# Patient Record
Sex: MALE | Race: WHITE | Employment: OTHER | URBAN - METROPOLITAN AREA
[De-identification: names, ages, dates, MRNs, and addresses within clinical notes are randomized per-mention and may not be internally consistent; named-entity substitution may affect disease eponyms.]

---

## 2018-09-15 ENCOUNTER — APPOINTMENT (OUTPATIENT)
Dept: VASCULAR SURGERY | Age: 68
End: 2018-09-15
Attending: EMERGENCY MEDICINE
Payer: MEDICARE

## 2018-09-15 ENCOUNTER — HOSPITAL ENCOUNTER (EMERGENCY)
Age: 68
Discharge: HOME | End: 2018-09-15
Attending: EMERGENCY MEDICINE
Payer: MEDICARE

## 2018-09-15 VITALS
DIASTOLIC BLOOD PRESSURE: 70 MMHG | TEMPERATURE: 98.3 F | HEIGHT: 72 IN | HEART RATE: 76 BPM | OXYGEN SATURATION: 94 % | RESPIRATION RATE: 16 BRPM | SYSTOLIC BLOOD PRESSURE: 146 MMHG | BODY MASS INDEX: 41.85 KG/M2 | WEIGHT: 309 LBS

## 2018-09-15 DIAGNOSIS — I83.029 VENOUS STASIS ULCERS OF BOTH LOWER EXTREMITIES (HCC): Primary | ICD-10-CM

## 2018-09-15 DIAGNOSIS — L97.919 VENOUS STASIS ULCERS OF BOTH LOWER EXTREMITIES (HCC): Primary | ICD-10-CM

## 2018-09-15 DIAGNOSIS — I83.019 VENOUS STASIS ULCERS OF BOTH LOWER EXTREMITIES (HCC): Primary | ICD-10-CM

## 2018-09-15 DIAGNOSIS — L97.929 VENOUS STASIS ULCERS OF BOTH LOWER EXTREMITIES (HCC): Primary | ICD-10-CM

## 2018-09-15 LAB
ALBUMIN SERPL-MCNC: 2.8 G/DL (ref 3.4–5)
ALBUMIN/GLOB SERPL: 0.5 {RATIO} (ref 0.8–1.7)
ALP SERPL-CCNC: 107 U/L (ref 45–117)
ALT SERPL-CCNC: 55 U/L (ref 16–61)
ANION GAP SERPL CALC-SCNC: 7 MMOL/L (ref 3–18)
AST SERPL-CCNC: 101 U/L (ref 15–37)
BASOPHILS # BLD: 0.1 K/UL (ref 0–0.1)
BASOPHILS NFR BLD: 2 % (ref 0–2)
BILIRUB SERPL-MCNC: 1.7 MG/DL (ref 0.2–1)
BUN SERPL-MCNC: 13 MG/DL (ref 7–18)
BUN/CREAT SERPL: 15 (ref 12–20)
CALCIUM SERPL-MCNC: 8.9 MG/DL (ref 8.5–10.1)
CHLORIDE SERPL-SCNC: 102 MMOL/L (ref 100–108)
CO2 SERPL-SCNC: 30 MMOL/L (ref 21–32)
CREAT SERPL-MCNC: 0.89 MG/DL (ref 0.6–1.3)
DIFFERENTIAL METHOD BLD: ABNORMAL
EOSINOPHIL # BLD: 0.4 K/UL (ref 0–0.4)
EOSINOPHIL NFR BLD: 7 % (ref 0–5)
ERYTHROCYTE [DISTWIDTH] IN BLOOD BY AUTOMATED COUNT: 14.7 % (ref 11.6–14.5)
GLOBULIN SER CALC-MCNC: 5.5 G/DL (ref 2–4)
GLUCOSE SERPL-MCNC: 112 MG/DL (ref 74–99)
HCT VFR BLD AUTO: 39.6 % (ref 36–48)
HGB BLD-MCNC: 13.9 G/DL (ref 13–16)
INR PPP: 1.8 (ref 0.8–1.2)
LYMPHOCYTES # BLD: 1.6 K/UL (ref 0.9–3.6)
LYMPHOCYTES NFR BLD: 29 % (ref 21–52)
MCH RBC QN AUTO: 31.2 PG (ref 24–34)
MCHC RBC AUTO-ENTMCNC: 35.1 G/DL (ref 31–37)
MCV RBC AUTO: 88.8 FL (ref 74–97)
MONOCYTES # BLD: 1.1 K/UL (ref 0.05–1.2)
MONOCYTES NFR BLD: 20 % (ref 3–10)
NEUTS SEG # BLD: 2.4 K/UL (ref 1.8–8)
NEUTS SEG NFR BLD: 42 % (ref 40–73)
PLATELET # BLD AUTO: 153 K/UL (ref 135–420)
PMV BLD AUTO: 11.2 FL (ref 9.2–11.8)
POTASSIUM SERPL-SCNC: 3.3 MMOL/L (ref 3.5–5.5)
PROT SERPL-MCNC: 8.3 G/DL (ref 6.4–8.2)
PROTHROMBIN TIME: 20.8 SEC (ref 11.5–15.2)
RBC # BLD AUTO: 4.46 M/UL (ref 4.7–5.5)
SODIUM SERPL-SCNC: 139 MMOL/L (ref 136–145)
WBC # BLD AUTO: 5.5 K/UL (ref 4.6–13.2)

## 2018-09-15 PROCEDURE — 74011250637 HC RX REV CODE- 250/637: Performed by: EMERGENCY MEDICINE

## 2018-09-15 PROCEDURE — 99284 EMERGENCY DEPT VISIT MOD MDM: CPT

## 2018-09-15 PROCEDURE — 93970 EXTREMITY STUDY: CPT

## 2018-09-15 PROCEDURE — 85610 PROTHROMBIN TIME: CPT | Performed by: EMERGENCY MEDICINE

## 2018-09-15 PROCEDURE — 85025 COMPLETE CBC W/AUTO DIFF WBC: CPT | Performed by: EMERGENCY MEDICINE

## 2018-09-15 PROCEDURE — 80053 COMPREHEN METABOLIC PANEL: CPT | Performed by: EMERGENCY MEDICINE

## 2018-09-15 RX ORDER — POTASSIUM CHLORIDE 20MEQ/15ML
40 LIQUID (ML) ORAL
Status: DISCONTINUED | OUTPATIENT
Start: 2018-09-15 | End: 2018-09-15 | Stop reason: CLARIF

## 2018-09-15 RX ORDER — POTASSIUM CHLORIDE 1.5 G/1.77G
40 POWDER, FOR SOLUTION ORAL
Status: DISCONTINUED | OUTPATIENT
Start: 2018-09-15 | End: 2018-09-15 | Stop reason: HOSPADM

## 2018-09-15 RX ORDER — POTASSIUM CHLORIDE 20 MEQ/1
40 TABLET, EXTENDED RELEASE ORAL
Status: COMPLETED | OUTPATIENT
Start: 2018-09-15 | End: 2018-09-15

## 2018-09-15 RX ADMIN — POTASSIUM CHLORIDE 40 MEQ: 20 TABLET, EXTENDED RELEASE ORAL at 12:17

## 2018-09-15 NOTE — DISCHARGE INSTRUCTIONS
Venous Skin Ulcer: Care Instructions  Your Care Instructions  A venous skin ulcer is a shallow wound that develops when the leg veins do not move blood back to the heart normally. Your veins have one-way valves that keep blood flowing toward the heart. When the valves are damaged, the blood can back up and pool in the vein. The blood may leak out of the vein into tissue around the vein. The tissue can break down and form an ulcer. The first sign of a venous skin ulcer is skin that turns dark red or purple over the area where the blood is leaking out of the vein. The skin also may become thick, dry, and itchy. Without treatment, an ulcer may form. The ulcer may be painful. Your leg also may swell and ache. If the ulcer becomes infected, the infection may cause an odor, and pus may drain from the ulcer. The area around the ulcer also may be more tender and red. Follow-up care is a key part of your treatment and safety. Be sure to make and go to all appointments, and call your doctor if you are having problems. It's also a good idea to know your test results and keep a list of the medicines you take. How can you care for yourself at home? · Follow your doctor's instructions on how to clean the ulcer and change the bandage. · If your doctor prescribed antibiotics, take them as directed. Do not stop taking them just because you feel better. You need to take the full course of antibiotics. · Lift your legs above the level of your heart as often as possible. For example, lie down and then prop up your legs with pillows. · Wear compression stockings or bandages. They help the blood circulate in your legs. And they help prevent blood from pooling in your legs. But there are different types of stockings, and they need to fit right. So your doctor will recommend what you need. · After your ulcer has healed, continue to wear compression stockings. Take them off only when you bathe and sleep.  Compression helps your blood circulate and helps prevent other ulcers from forming. · Walk daily. Walking helps your blood circulation. When should you call for help? Call your doctor now or seek immediate medical care if:    · You have symptoms of infection, such as:  ¨ Increased pain, swelling, warmth, or redness. ¨ Red streaks leading from the ulcer. ¨ Pus draining from the ulcer. ¨ A fever.    Watch closely for changes in your health, and be sure to contact your doctor if:    · Your ulcer is not healing.     · You have new ulcers.     · The ulcer starts to bleed, and blood soaks through the bandage. Oozing small amounts of a mix of blood and fluid is normal.     · You have new bleeding.     · You do not get better as expected. Where can you learn more? Go to http://hong-natalia.info/. Enter A209 in the search box to learn more about \"Venous Skin Ulcer: Care Instructions. \"  Current as of: November 20, 2017  Content Version: 11.7  © 6111-5983 [employer]. Care instructions adapted under license by [employer] (which disclaims liability or warranty for this information). If you have questions about a medical condition or this instruction, always ask your healthcare professional. [place] [address] any warranty or liability for your use of this information. Learning About Using Compression Stockings  What are compression stockings? Compression stockings may help ease symptoms and prevent problems caused by things like varicose veins, skin ulcers, and deep vein thrombosis. But there are different types of stockings, and they need to fit right. So your doctor will recommend what you need. These stockings are the most common treatment for varicose veins. They help the blood circulate in your legs. This can prevent skin ulcers and keep blood from building up in the legs. Prescription stockings are tightest at the foot.  They get less and less tight farther up on your legs.  The kind you buy without a prescription have lighter elastic. So the pressure is even all the way up the leg. These don't cost as much. But they don't provide the compression you need to treat serious symptoms or prevent skin ulcers. How do you use compression stockings? · If your stockings are new, you may want to wash them before you put them on. This can make them more flexible and easier to put on. It's a good idea to wash them by hand. · Most of the time it's best to put on your stockings early in the morning. This is when you have the least swelling in your legs. · Put silicone lotion (such as ALPS) or talcum powder on your legs to help the stockings slide on. If your stockings contain latex, or you aren't sure if they contain latex, do not use other types of lotions or creams on your legs when you wear the stockings. You may use other lotions or creams when you are not wearing the stockings. · Sit in a chair with a back while you put on the stockings. This gives you something to lean against as you pull them up. · How to put on stockings:  ¨ Turn your stocking inside out. Then put your toe in as far as it will go. ¨ Readjust the stocking by folding it back onto itself at the ankle. Then hold both sides of the folded stocking. ¨ Pull toward your body as far as you can. ¨ Fold back the stocking again farther up on your leg. Then pull the stocking up to that point. ¨ Repeat folding back and pulling until the stocking is in the right place. · You may want to wear rubber gloves. They can help you hold the stockings better. · If your stockings don't have toes, use a silk \"slip sock. \" (You can get one from your medical supplier.) This will help the stocking slide over your foot better. When you're done, pull off the sock through the open toe. · If you still can't get your stockings on, you may want to use a \"stocking oneal. \" This is a metal device that holds the stocking open while you step into it. It is often recommended for people who have problems grasping, leaning, or pulling. Before you buy one, try it first. Some people find them hard to use. What else should you know about compression stockings? · You will probably need to buy a new pair every 4 to 6 months. · They can be uncomfortable if you wear them all day. They are hot and may be hard to put on. · It is important to think about the pros and cons of stockings. You may not like them. But they may help relieve symptoms and prevent future problems. Where can you learn more? Go to http://hong-natalia.info/. Enter J166 in the search box to learn more about \"Learning About Using Compression Stockings. \"  Current as of: November 21, 2017  Content Version: 11.7  © 7085-3932 [employer]. Care instructions adapted under license by [employer] (which disclaims liability or warranty for this information). If you have questions about a medical condition or this instruction, always ask your healthcare professional. [place] [address] any warranty or liability for your use of this information.

## 2018-09-15 NOTE — ED PROVIDER NOTES
EMERGENCY DEPARTMENT HISTORY AND PHYSICAL EXAM    9:49 AM      Date: 9/15/2018  Patient Name: Willi Dixon    History of Presenting Illness     Chief Complaint   Patient presents with    Skin Infection         History Provided By: Patient    Chief Complaint: Skin problem   Duration:3  Months  Timing:  Constant and Worsening  Location: bilateral lower legs   Quality: erythematous and burning   Severity: Moderate  Modifying Factors: Bactrim and Keflex, with no relief of Sx   Associated Symptoms: None       Additional History (Context): Willi Dixon is a 70 y.o. male with PMHx of HTN, multiple PE, fatty liver, DVT, and 4 past brain surgeries who presents with c/o moderate constant and worsening erythematous and burning skin problem located on the bilateral lower legs that started 3 months ago. Pt states the Sx started 3 months ago and he was seen and evaluated by his PCP and he was prescribed Bactrim and he finished the 10 day course of the antibiotic and the Sx worsened. The pt then went to [facility] to be evaluated 2 months ago and he was prescribed Keflex, there was no relief of Sx. Pt states he wants to be evaluated now because the skin problem is beginning to be uncomfortable and starting to burn. Pt is a past tobacco user. Pt denies nausea, vomiting, fever, and chills. Denies any further complaints or symptoms at the moment. PCP: Lilian Lee MD        Past History     Past Medical History:  No past medical history on file. Past Surgical History:  No past surgical history on file. Family History:  No family history on file. Social History:  Social History   Substance Use Topics    Smoking status: Not on file    Smokeless tobacco: Not on file    Alcohol use Not on file       Allergies:   Allergies   Allergen Reactions    Penicillins Unknown (comments)     Patient was told he was allergic since he was an infant         Review of Systems       Review of Systems   Constitutional: Negative for chills and fever. Respiratory: Negative for shortness of breath. Cardiovascular: Negative for chest pain. Gastrointestinal: Negative for nausea and vomiting. Skin: Positive for rash (bilateral lower legs). All other systems reviewed and are negative. Physical Exam     Visit Vitals    /70    Pulse 76    Temp 98.3 °F (36.8 °C)    Resp 16    Ht 6' (1.829 m)    Wt 140.2 kg (309 lb)    SpO2 94%    BMI 41.91 kg/m2         Physical Exam   Constitutional: He is oriented to person, place, and time. He appears well-developed and well-nourished. No distress. HENT:   Head: Normocephalic and atraumatic. Eyes: Conjunctivae and EOM are normal. Right eye exhibits no discharge. Left eye exhibits no discharge. No scleral icterus. Neck: Normal range of motion. Neck supple. No tracheal deviation present. Cardiovascular: Normal rate, regular rhythm and normal heart sounds. No murmur heard. Pulmonary/Chest: Effort normal and breath sounds normal. No respiratory distress. He has no wheezes. He has no rales. Abdominal: Soft. He exhibits no distension. There is no tenderness. There is no rebound and no guarding. Musculoskeletal: Normal range of motion. He exhibits no edema or deformity. Neurological: He is alert and oriented to person, place, and time. No cranial nerve deficit. Skin: Skin is warm and dry. He is not diaphoretic. Bilateral lower leg erythema to knee with multiple ulcerations and shiny skin    Psychiatric: He has a normal mood and affect.  His behavior is normal. Judgment and thought content normal.         Diagnostic Study Results     Labs -  Recent Results (from the past 12 hour(s))   CBC WITH AUTOMATED DIFF    Collection Time: 09/15/18  9:38 AM   Result Value Ref Range    WBC 5.5 4.6 - 13.2 K/uL    RBC 4.46 (L) 4.70 - 5.50 M/uL    HGB 13.9 13.0 - 16.0 g/dL    HCT 39.6 36.0 - 48.0 %    MCV 88.8 74.0 - 97.0 FL    MCH 31.2 24.0 - 34.0 PG    MCHC 35.1 31.0 - 37.0 g/dL    RDW 14. 7 (H) 11.6 - 14.5 %    PLATELET 785 013 - 761 K/uL    MPV 11.2 9.2 - 11.8 FL    NEUTROPHILS 42 40 - 73 %    LYMPHOCYTES 29 21 - 52 %    MONOCYTES 20 (H) 3 - 10 %    EOSINOPHILS 7 (H) 0 - 5 %    BASOPHILS 2 0 - 2 %    ABS. NEUTROPHILS 2.4 1.8 - 8.0 K/UL    ABS. LYMPHOCYTES 1.6 0.9 - 3.6 K/UL    ABS. MONOCYTES 1.1 0.05 - 1.2 K/UL    ABS. EOSINOPHILS 0.4 0.0 - 0.4 K/UL    ABS. BASOPHILS 0.1 0.0 - 0.1 K/UL    DF AUTOMATED     METABOLIC PANEL, COMPREHENSIVE    Collection Time: 09/15/18  9:38 AM   Result Value Ref Range    Sodium 139 136 - 145 mmol/L    Potassium 3.3 (L) 3.5 - 5.5 mmol/L    Chloride 102 100 - 108 mmol/L    CO2 30 21 - 32 mmol/L    Anion gap 7 3.0 - 18 mmol/L    Glucose 112 (H) 74 - 99 mg/dL    BUN 13 7.0 - 18 MG/DL    Creatinine 0.89 0.6 - 1.3 MG/DL    BUN/Creatinine ratio 15 12 - 20      GFR est AA >60 >60 ml/min/1.73m2    GFR est non-AA >60 >60 ml/min/1.73m2    Calcium 8.9 8.5 - 10.1 MG/DL    Bilirubin, total 1.7 (H) 0.2 - 1.0 MG/DL    ALT (SGPT) 55 16 - 61 U/L    AST (SGOT) 101 (H) 15 - 37 U/L    Alk. phosphatase 107 45 - 117 U/L    Protein, total 8.3 (H) 6.4 - 8.2 g/dL    Albumin 2.8 (L) 3.4 - 5.0 g/dL    Globulin 5.5 (H) 2.0 - 4.0 g/dL    A-G Ratio 0.5 (L) 0.8 - 1.7     PROTHROMBIN TIME + INR    Collection Time: 09/15/18  9:38 AM   Result Value Ref Range    Prothrombin time 20.8 (H) 11.5 - 15.2 sec    INR 1.8 (H) 0.8 - 1.2         Radiologic Studies -   No orders to display         Medical Decision Making   I am the first provider for this patient. I reviewed the vital signs, available nursing notes, past medical history, past surgical history, family history and social history. Vital Signs-Reviewed the patient's vital signs. Pulse Oximetry Analysis -  93% on room air     Records Reviewed: Nursing Notes (Time of Review: 9:49 AM)    Provider Notes (Medical Decision Making): Pt with chronic venostasis dermatitis and ulcerations. No acute DVT on imaging.  Pt had elevated fasting glucose, low potassium, and elevated liver function test. I discussed the need for PCP follow-up to be checked for diabetes and discussed heavy ETOH use and the need to stop drinking. Pt will be referred to vascular for chronic venostasis. Diagnosis     Clinical Impression:   1. Venous stasis ulcers of both lower extremities ([address]        Disposition: discharged     Follow-up Information     Follow up With Details Comments Contact Info Additional Information    [facility] EMERGENCY DEPT  If symptoms worsen [address] [facility] [address]     [facility] VASCULAR LAB Schedule an appointment as soon as possible for a visit  [facility]  [address] [facility] [address] 3:00PM:  Please arrive in the [address] [place] [address] and check in with the registrar 15 minutes prior to your scheduled appointment time. This is the same entrance as the [facility], facing [address]. [facility] Parking: turn off [address] onto [address]. Proceed one block and make a right onto [address] [place] will be on your left). Go to the end of [address] and parking is located on your left. APPOINTMENTS SCHEDULED AT 3:00PM OR LATER:  Registration in the [address] [place] [address] CLOSES at 3:00 p.m. Patients should report to Patient Access/Admitting located on the left after entering the MAIN entrance of [facility]. Patient should plan to arrive 30 minutes prior to their appointment time if going to Patient Access/Admitting. After test, patient may exit from the [address] [place] [address] or [facility] Entrance. Discharge Medication List as of 9/15/2018 12:06 PM      START taking these medications    Details   compr.stocking,knee,long,large (COMP STOCKING,KNEE,LONG,LARGE) misc 1 Package by Does Not Apply route daily. , Print, Disp-3 Package, R-0           _______________________________    Attestations:  Ximena Cid 89 acting as a scribe for and in the presence of Sebas Hernandez MD      September 15, 2018 at 9:49 AM       Provider Attestation:      I personally performed the services described in the documentation, reviewed the documentation, as recorded by the scribe in my presence, and it accurately and completely records my words and actions.  September 15, 2018 at 9:49 AM - Sebas Hernandez MD    _______________________________

## 2018-09-15 NOTE — ED TRIAGE NOTES
Patient  Presents with bilateral cellulitis in legs x 3 months. Patient has had several rounds of antibiotics but with no relief.  Patient has a hx of HTN and PE.

## 2022-06-14 ENCOUNTER — APPOINTMENT (OUTPATIENT)
Dept: GENERAL RADIOLOGY | Age: 72
DRG: 291 | End: 2022-06-14
Attending: STUDENT IN AN ORGANIZED HEALTH CARE EDUCATION/TRAINING PROGRAM
Payer: MEDICARE

## 2022-06-14 ENCOUNTER — APPOINTMENT (OUTPATIENT)
Dept: ULTRASOUND IMAGING | Age: 72
DRG: 291 | End: 2022-06-14
Attending: STUDENT IN AN ORGANIZED HEALTH CARE EDUCATION/TRAINING PROGRAM
Payer: MEDICARE

## 2022-06-14 ENCOUNTER — HOSPITAL ENCOUNTER (INPATIENT)
Age: 72
LOS: 8 days | Discharge: HOME | DRG: 291 | End: 2022-06-22
Attending: STUDENT IN AN ORGANIZED HEALTH CARE EDUCATION/TRAINING PROGRAM | Admitting: HOSPITALIST
Payer: MEDICARE

## 2022-06-14 DIAGNOSIS — I50.33 ACUTE ON CHRONIC DIASTOLIC HEART FAILURE (HCC): ICD-10-CM

## 2022-06-14 PROBLEM — G47.30 SLEEP APNEA: Status: ACTIVE | Noted: 2019-02-21

## 2022-06-14 PROBLEM — M79.605 BILATERAL LOWER EXTREMITY PAIN: Status: ACTIVE | Noted: 2022-06-14

## 2022-06-14 PROBLEM — I48.0 PAROXYSMAL ATRIAL FIBRILLATION (HCC): Status: ACTIVE | Noted: 2020-08-25

## 2022-06-14 PROBLEM — I50.32 CHRONIC DIASTOLIC HEART FAILURE (HCC): Status: ACTIVE | Noted: 2019-02-21

## 2022-06-14 PROBLEM — I10 ESSENTIAL HYPERTENSION: Status: ACTIVE | Noted: 2018-01-31

## 2022-06-14 PROBLEM — E66.01 CLASS 2 SEVERE OBESITY DUE TO EXCESS CALORIES WITH SERIOUS COMORBIDITY IN ADULT (HCC): Status: ACTIVE | Noted: 2019-05-03

## 2022-06-14 PROBLEM — E66.9 OBESITY (BMI 30-39.9): Status: ACTIVE | Noted: 2019-02-21

## 2022-06-14 PROBLEM — F32.A CHRONIC DEPRESSION: Status: ACTIVE | Noted: 2021-08-25

## 2022-06-14 PROBLEM — M79.604 BILATERAL LOWER EXTREMITY PAIN: Status: ACTIVE | Noted: 2022-06-14

## 2022-06-14 PROBLEM — F10.11 H/O ALCOHOL ABUSE: Status: ACTIVE | Noted: 2019-02-21

## 2022-06-14 PROBLEM — I73.9 PERIPHERAL VASCULAR DISEASE, UNSPECIFIED (HCC): Status: ACTIVE | Noted: 2022-02-23

## 2022-06-14 PROBLEM — E87.70 FLUID OVERLOAD: Status: ACTIVE | Noted: 2022-06-14

## 2022-06-14 PROBLEM — N50.89 SCROTAL EDEMA: Status: ACTIVE | Noted: 2022-06-14

## 2022-06-14 PROBLEM — F33.41 RECURRENT MAJOR DEPRESSIVE DISORDER, IN PARTIAL REMISSION (HCC): Status: ACTIVE | Noted: 2019-02-26

## 2022-06-14 PROBLEM — G20 PARKINSON'S DISEASE (HCC): Status: ACTIVE | Noted: 2022-02-23

## 2022-06-14 LAB
ALBUMIN SERPL-MCNC: 2.9 G/DL (ref 3.4–5)
ALBUMIN/GLOB SERPL: 0.7 {RATIO} (ref 0.8–1.7)
ALP SERPL-CCNC: 122 U/L (ref 45–117)
ALT SERPL-CCNC: 18 U/L (ref 16–61)
ANION GAP SERPL CALC-SCNC: 5 MMOL/L (ref 3–18)
APPEARANCE UR: CLEAR
AST SERPL-CCNC: 30 U/L (ref 10–38)
BASOPHILS # BLD: 0.1 K/UL (ref 0–0.1)
BASOPHILS NFR BLD: 1 % (ref 0–2)
BILIRUB DIRECT SERPL-MCNC: 0.4 MG/DL (ref 0–0.2)
BILIRUB SERPL-MCNC: 1.5 MG/DL (ref 0.2–1)
BILIRUB UR QL: NEGATIVE
BNP SERPL-MCNC: 103 PG/ML (ref 0–900)
BUN SERPL-MCNC: 9 MG/DL (ref 7–18)
BUN/CREAT SERPL: 12 (ref 12–20)
CALCIUM SERPL-MCNC: 8.5 MG/DL (ref 8.5–10.1)
CHLORIDE SERPL-SCNC: 103 MMOL/L (ref 100–111)
CO2 SERPL-SCNC: 32 MMOL/L (ref 21–32)
COLOR UR: YELLOW
CREAT SERPL-MCNC: 0.73 MG/DL (ref 0.6–1.3)
DIFFERENTIAL METHOD BLD: ABNORMAL
EOSINOPHIL # BLD: 0.2 K/UL (ref 0–0.4)
EOSINOPHIL NFR BLD: 4 % (ref 0–5)
ERYTHROCYTE [DISTWIDTH] IN BLOOD BY AUTOMATED COUNT: 12.4 % (ref 11.6–14.5)
GLOBULIN SER CALC-MCNC: 4.2 G/DL (ref 2–4)
GLUCOSE SERPL-MCNC: 85 MG/DL (ref 74–99)
GLUCOSE UR STRIP.AUTO-MCNC: NEGATIVE MG/DL
HCT VFR BLD AUTO: 40.7 % (ref 36–48)
HGB BLD-MCNC: 13.8 G/DL (ref 13–16)
HGB UR QL STRIP: NEGATIVE
IMM GRANULOCYTES # BLD AUTO: 0 K/UL (ref 0–0.04)
IMM GRANULOCYTES NFR BLD AUTO: 0 % (ref 0–0.5)
INR PPP: 2.1 (ref 0.8–1.2)
KETONES UR QL STRIP.AUTO: NEGATIVE MG/DL
LEUKOCYTE ESTERASE UR QL STRIP.AUTO: NEGATIVE
LYMPHOCYTES # BLD: 1.4 K/UL (ref 0.9–3.6)
LYMPHOCYTES NFR BLD: 27 % (ref 21–52)
MCH RBC QN AUTO: 31.5 PG (ref 24–34)
MCHC RBC AUTO-ENTMCNC: 33.9 G/DL (ref 31–37)
MCV RBC AUTO: 92.9 FL (ref 78–100)
MONOCYTES # BLD: 1.1 K/UL (ref 0.05–1.2)
MONOCYTES NFR BLD: 21 % (ref 3–10)
NEUTS SEG # BLD: 2.5 K/UL (ref 1.8–8)
NEUTS SEG NFR BLD: 47 % (ref 40–73)
NITRITE UR QL STRIP.AUTO: NEGATIVE
NRBC # BLD: 0 K/UL (ref 0–0.01)
NRBC BLD-RTO: 0 PER 100 WBC
PH UR STRIP: 7.5 [PH] (ref 5–8)
PLATELET # BLD AUTO: 119 K/UL (ref 135–420)
PMV BLD AUTO: 11.6 FL (ref 9.2–11.8)
POTASSIUM SERPL-SCNC: 3.6 MMOL/L (ref 3.5–5.5)
PROT SERPL-MCNC: 7.1 G/DL (ref 6.4–8.2)
PROT UR STRIP-MCNC: NEGATIVE MG/DL
PROTHROMBIN TIME: 23.5 SEC (ref 11.5–15.2)
RBC # BLD AUTO: 4.38 M/UL (ref 4.35–5.65)
SODIUM SERPL-SCNC: 140 MMOL/L (ref 136–145)
SP GR UR REFRACTOMETRY: 1.01 (ref 1–1.03)
UROBILINOGEN UR QL STRIP.AUTO: 2 EU/DL (ref 0.2–1)
WBC # BLD AUTO: 5.3 K/UL (ref 4.6–13.2)

## 2022-06-14 PROCEDURE — 99223 1ST HOSP IP/OBS HIGH 75: CPT | Performed by: HOSPITALIST

## 2022-06-14 PROCEDURE — 85025 COMPLETE CBC W/AUTO DIFF WBC: CPT

## 2022-06-14 PROCEDURE — 76870 US EXAM SCROTUM: CPT

## 2022-06-14 PROCEDURE — 85610 PROTHROMBIN TIME: CPT

## 2022-06-14 PROCEDURE — 99285 EMERGENCY DEPT VISIT HI MDM: CPT

## 2022-06-14 PROCEDURE — 83880 ASSAY OF NATRIURETIC PEPTIDE: CPT

## 2022-06-14 PROCEDURE — 74011250636 HC RX REV CODE- 250/636: Performed by: NURSE PRACTITIONER

## 2022-06-14 PROCEDURE — 96374 THER/PROPH/DIAG INJ IV PUSH: CPT

## 2022-06-14 PROCEDURE — 51702 INSERT TEMP BLADDER CATH: CPT

## 2022-06-14 PROCEDURE — 80048 BASIC METABOLIC PNL TOTAL CA: CPT

## 2022-06-14 PROCEDURE — APPSS60 APP SPLIT SHARED TIME 46-60 MINUTES: Performed by: NURSE PRACTITIONER

## 2022-06-14 PROCEDURE — 74011250636 HC RX REV CODE- 250/636: Performed by: STUDENT IN AN ORGANIZED HEALTH CARE EDUCATION/TRAINING PROGRAM

## 2022-06-14 PROCEDURE — 71045 X-RAY EXAM CHEST 1 VIEW: CPT

## 2022-06-14 PROCEDURE — 74011000250 HC RX REV CODE- 250: Performed by: NURSE PRACTITIONER

## 2022-06-14 PROCEDURE — 65270000046 HC RM TELEMETRY

## 2022-06-14 PROCEDURE — 2709999900 HC NON-CHARGEABLE SUPPLY

## 2022-06-14 PROCEDURE — 81003 URINALYSIS AUTO W/O SCOPE: CPT

## 2022-06-14 PROCEDURE — 80076 HEPATIC FUNCTION PANEL: CPT

## 2022-06-14 PROCEDURE — 74011250637 HC RX REV CODE- 250/637: Performed by: NURSE PRACTITIONER

## 2022-06-14 PROCEDURE — 93005 ELECTROCARDIOGRAM TRACING: CPT

## 2022-06-14 PROCEDURE — 36415 COLL VENOUS BLD VENIPUNCTURE: CPT

## 2022-06-14 RX ORDER — PANTOPRAZOLE SODIUM 40 MG/1
40 TABLET, DELAYED RELEASE ORAL
Status: DISCONTINUED | OUTPATIENT
Start: 2022-06-15 | End: 2022-06-22 | Stop reason: HOSPADM

## 2022-06-14 RX ORDER — FLUOXETINE HYDROCHLORIDE 20 MG/1
20 CAPSULE ORAL DAILY
Status: DISCONTINUED | OUTPATIENT
Start: 2022-06-15 | End: 2022-06-22 | Stop reason: HOSPADM

## 2022-06-14 RX ORDER — ONDANSETRON 2 MG/ML
4 INJECTION INTRAMUSCULAR; INTRAVENOUS
Status: DISCONTINUED | OUTPATIENT
Start: 2022-06-14 | End: 2022-06-22 | Stop reason: HOSPADM

## 2022-06-14 RX ORDER — OMEPRAZOLE 20 MG/1
20 CAPSULE, DELAYED RELEASE ORAL DAILY
COMMUNITY
Start: 2022-02-23

## 2022-06-14 RX ORDER — CARVEDILOL 6.25 MG/1
6.25 TABLET ORAL 2 TIMES DAILY
Status: DISCONTINUED | OUTPATIENT
Start: 2022-06-15 | End: 2022-06-22 | Stop reason: HOSPADM

## 2022-06-14 RX ORDER — SODIUM CHLORIDE 0.9 % (FLUSH) 0.9 %
5-40 SYRINGE (ML) INJECTION EVERY 8 HOURS
Status: DISCONTINUED | OUTPATIENT
Start: 2022-06-14 | End: 2022-06-22 | Stop reason: HOSPADM

## 2022-06-14 RX ORDER — SODIUM CHLORIDE 0.9 % (FLUSH) 0.9 %
5-40 SYRINGE (ML) INJECTION AS NEEDED
Status: DISCONTINUED | OUTPATIENT
Start: 2022-06-14 | End: 2022-06-22 | Stop reason: HOSPADM

## 2022-06-14 RX ORDER — ACETAMINOPHEN 650 MG/1
650 SUPPOSITORY RECTAL
Status: DISCONTINUED | OUTPATIENT
Start: 2022-06-14 | End: 2022-06-22 | Stop reason: HOSPADM

## 2022-06-14 RX ORDER — POTASSIUM CHLORIDE 1500 MG/1
20 TABLET, FILM COATED, EXTENDED RELEASE ORAL DAILY
COMMUNITY
Start: 2021-10-20

## 2022-06-14 RX ORDER — ONDANSETRON 4 MG/1
4 TABLET, ORALLY DISINTEGRATING ORAL
Status: DISCONTINUED | OUTPATIENT
Start: 2022-06-14 | End: 2022-06-22 | Stop reason: HOSPADM

## 2022-06-14 RX ORDER — FUROSEMIDE 10 MG/ML
40 INJECTION INTRAMUSCULAR; INTRAVENOUS ONCE
Status: COMPLETED | OUTPATIENT
Start: 2022-06-14 | End: 2022-06-14

## 2022-06-14 RX ORDER — FUROSEMIDE 10 MG/ML
40 INJECTION INTRAMUSCULAR; INTRAVENOUS 2 TIMES DAILY
Status: DISCONTINUED | OUTPATIENT
Start: 2022-06-14 | End: 2022-06-20

## 2022-06-14 RX ORDER — WARFARIN SODIUM 5 MG/1
TABLET ORAL
COMMUNITY
Start: 2021-08-24

## 2022-06-14 RX ORDER — FLUOXETINE HYDROCHLORIDE 20 MG/1
20 CAPSULE ORAL DAILY
COMMUNITY
Start: 2022-02-23

## 2022-06-14 RX ORDER — ACETAMINOPHEN 325 MG/1
650 TABLET ORAL
Status: DISCONTINUED | OUTPATIENT
Start: 2022-06-14 | End: 2022-06-22 | Stop reason: HOSPADM

## 2022-06-14 RX ORDER — CARVEDILOL 6.25 MG/1
6.25 TABLET ORAL 2 TIMES DAILY
COMMUNITY
Start: 2021-10-20

## 2022-06-14 RX ORDER — FUROSEMIDE 40 MG/1
TABLET ORAL
COMMUNITY
Start: 2021-10-20 | End: 2022-06-22

## 2022-06-14 RX ORDER — POLYETHYLENE GLYCOL 3350 17 G/17G
17 POWDER, FOR SOLUTION ORAL DAILY PRN
Status: DISCONTINUED | OUTPATIENT
Start: 2022-06-14 | End: 2022-06-22 | Stop reason: HOSPADM

## 2022-06-14 RX ADMIN — FUROSEMIDE 40 MG: 10 INJECTION, SOLUTION INTRAMUSCULAR; INTRAVENOUS at 14:16

## 2022-06-14 RX ADMIN — ACETAMINOPHEN 650 MG: 325 TABLET ORAL at 22:50

## 2022-06-14 RX ADMIN — SODIUM CHLORIDE, PRESERVATIVE FREE 10 ML: 5 INJECTION INTRAVENOUS at 21:06

## 2022-06-14 RX ADMIN — FUROSEMIDE 40 MG: 10 INJECTION, SOLUTION INTRAMUSCULAR; INTRAVENOUS at 21:04

## 2022-06-14 NOTE — ED NOTES
Attempted to call report but receiving nurse is unavailable at this time receiving nurse will call back in about 15-20 minutes. Charge nurse made aware.

## 2022-06-14 NOTE — ED PROVIDER NOTES
EMERGENCY DEPARTMENT HISTORY AND PHYSICAL EXAM    3:56 PM      Date: 6/14/2022  Patient Name: Molly Graham    History of Presenting Illness     Chief Complaint   Patient presents with    Testicle Swelling         History Provided By: Patient  Location/Duration/Severity/Modifying factors   Patient is a 42-year-old male with a hx  of A. fib, congestive heart failure and hypertension presenting due to scrotal edema/swelling. Patient states that for the last week he has noticed increasing edema of his scrotum with pain. Patient states that due to the scrotal swelling a decided he needs to be evaluated. Patient states that he has been taking his Lasix regularly with not as much urine output as he normally expect with Lasix. PCP: Lilian Lee MD    Current Outpatient Medications   Medication Sig Dispense Refill    carvediloL (COREG) 6.25 mg tablet Take 6.25 mg by mouth two (2) times a day.  FLUoxetine (PROzac) 20 mg capsule Take 20 mg by mouth daily.  furosemide (LASIX) 40 mg tablet Take two tabs twice a day      omeprazole (PRILOSEC) 20 mg capsule Take 20 mg by mouth daily.  potassium chloride SR (K-TAB) 20 mEq tablet Take 20 mEq by mouth daily.  warfarin (COUMADIN) 5 mg tablet Take 1 tablet daily as directed by [facility].  compr.stocking,knee,long,large (COMP STOCKING,KNEE,LONG,LARGE) misc 1 Package by Does Not Apply route daily.  3 Package 0       Past History     Past Medical History:  Past Medical History:   Diagnosis Date    A-fib ([address]     Bronchitis     Cardiomyopathy ([address]     CHF (congestive heart failure) (HCC)     DVT (deep venous thrombosis) (HCC)     Essential tremor     Exposure to Agent [employer] GERD (gastroesophageal reflux disease)     History of pulmonary embolus (PE)     HTN (hypertension)     Major depressive disorder     Obesity     Parkinson's disease ([address]     PVD (peripheral vascular disease) ([address]     Vitamin D deficiency        Past Surgical History:  Past Surgical History:   Procedure Laterality Date    NEUROLOGICAL PROCEDURE UNLISTED      Neurostimulator to brain. Family History:  No family history on file. Social History:  Social History     Tobacco Use    Smoking status: Not on file    Smokeless tobacco: Not on file   Substance Use Topics    Alcohol use: Not on file    Drug use: Not on file       Allergies: Allergies   Allergen Reactions    Penicillins Unknown (comments)     Patient was told he was allergic since he was an infant         Review of Systems       Review of Systems   Constitutional: Negative for fatigue and fever. HENT: Negative for congestion and rhinorrhea. Eyes: Negative for visual disturbance. Respiratory: Negative for shortness of breath. Cardiovascular: Negative for chest pain and palpitations. Gastrointestinal: Negative for abdominal pain, diarrhea, nausea and vomiting. Genitourinary: Positive for difficulty urinating, scrotal swelling and testicular pain. Negative for dysuria and hematuria. Musculoskeletal: Negative for back pain. Skin: Negative for rash. Neurological: Negative for dizziness, weakness and light-headedness. All other systems reviewed and are negative. Physical Exam     Visit Vitals  /66   Pulse 72   Temp 98.7 °F (37.1 °C)   Resp 20   Ht 6' (1.829 m)   Wt 149.7 kg (330 lb)   SpO2 97%   BMI 44.76 kg/m²         Physical Exam  Vitals and nursing note reviewed. Constitutional:       General: He is not in acute distress. Appearance: He is well-developed. He is obese. He is not ill-appearing. HENT:      Head: Normocephalic and atraumatic. Right Ear: External ear normal.      Left Ear: External ear normal.      Nose: Nose normal.   Eyes:      Extraocular Movements: Extraocular movements intact. Conjunctiva/sclera: Conjunctivae normal.   Cardiovascular:      Rate and Rhythm: Normal rate. Heart sounds: Normal heart sounds.  No murmur heard.  No friction rub. No gallop. Pulmonary:      Effort: Pulmonary effort is normal.      Breath sounds: Normal breath sounds. No wheezing, rhonchi or rales. Chest:      Chest wall: No tenderness. Abdominal:      General: Bowel sounds are normal. There is no distension. Palpations: Abdomen is soft. Tenderness: There is no abdominal tenderness. Genitourinary:     Comments: Swollen/edematous scrotum with erythema, mildly tender to palpation, testicles feel normal without any masses palpated, difficult to find penis but able to by pushing back edematous tissue, head of penis left white dry secretions  Musculoskeletal:         General: No tenderness. Normal range of motion. Cervical back: Normal range of motion and neck supple. Skin:     General: Skin is warm and dry. Neurological:      General: No focal deficit present. Mental Status: He is alert and oriented to person, place, and time. Sensory: No sensory deficit. Motor: No weakness. Coordination: Coordination normal.   Psychiatric:         Behavior: Behavior normal.         Thought Content:  Thought content normal.         Judgment: Judgment normal.           Diagnostic Study Results     Labs -  Recent Results (from the past 12 hour(s))   URINALYSIS W/ RFLX MICROSCOPIC    Collection Time: 06/14/22  2:22 PM   Result Value Ref Range    Color YELLOW      Appearance CLEAR      Specific gravity 1.008 1.005 - 1.030      pH (UA) 7.5 5.0 - 8.0      Protein Negative NEG mg/dL    Glucose Negative NEG mg/dL    Ketone Negative NEG mg/dL    Bilirubin Negative NEG      Blood Negative NEG      Urobilinogen 2.0 (H) 0.2 - 1.0 EU/dL    Nitrites Negative NEG      Leukocyte Esterase Negative NEG     CBC WITH AUTOMATED DIFF    Collection Time: 06/14/22  3:15 PM   Result Value Ref Range    WBC 5.3 4.6 - 13.2 K/uL    RBC 4.38 4.35 - 5.65 M/uL    HGB 13.8 13.0 - 16.0 g/dL    HCT 40.7 36.0 - 48.0 %    MCV 92.9 78.0 - 100.0 FL    MCH 31.5 24.0 - 34.0 PG    MCHC 33.9 31.0 - 37.0 g/dL    RDW 12.4 11.6 - 14.5 %    PLATELET 133 (L) 727 - 420 K/uL    MPV 11.6 9.2 - 11.8 FL    NRBC 0.0 0  WBC    ABSOLUTE NRBC 0.00 0.00 - 0.01 K/uL    NEUTROPHILS 47 40 - 73 %    LYMPHOCYTES 27 21 - 52 %    MONOCYTES 21 (H) 3 - 10 %    EOSINOPHILS 4 0 - 5 %    BASOPHILS 1 0 - 2 %    IMMATURE GRANULOCYTES 0 0.0 - 0.5 %    ABS. NEUTROPHILS 2.5 1.8 - 8.0 K/UL    ABS. LYMPHOCYTES 1.4 0.9 - 3.6 K/UL    ABS. MONOCYTES 1.1 0.05 - 1.2 K/UL    ABS. EOSINOPHILS 0.2 0.0 - 0.4 K/UL    ABS. BASOPHILS 0.1 0.0 - 0.1 K/UL    ABS. IMM. GRANS. 0.0 0.00 - 0.04 K/UL    DF AUTOMATED     METABOLIC PANEL, BASIC    Collection Time: 06/14/22  3:15 PM   Result Value Ref Range    Sodium 140 136 - 145 mmol/L    Potassium 3.6 3.5 - 5.5 mmol/L    Chloride 103 100 - 111 mmol/L    CO2 32 21 - 32 mmol/L    Anion gap 5 3.0 - 18 mmol/L    Glucose 85 74 - 99 mg/dL    BUN 9 7.0 - 18 MG/DL    Creatinine 0.73 0.6 - 1.3 MG/DL    BUN/Creatinine ratio 12 12 - 20      GFR est AA >60 >60 ml/min/1.73m2    GFR est non-AA >60 >60 ml/min/1.73m2    Calcium 8.5 8.5 - 10.1 MG/DL       Radiologic Studies -   XR CHEST PORT   Final Result   :      1. Nothing clearly acute. Slight prominence of the vasculature without overt   edema to substantiate acute heart failure      US SCROTUM/TESTICLES W DOPPLER   Final Result      1. Severe bilateral scrotal wall edema   -Focal plaque shaped thickening along the left tunica/scrotal sac. Could be   phlegmonous inflammation, but there is no drainable abscess. -Bilateral testicles themselves are intact without mass, fluid collection, or   torsion.    -Bilateral hydroceles                  Medical Decision Making   I am the first provider for this patient. I reviewed the vital signs, available nursing notes, past medical history, past surgical history, family history and social history. Vital Signs-Reviewed the patient's vital signs.       EKG:     Records Reviewed: Nursing Notes (Time of Review: 3:56 PM)    ED Course: Progress Notes, Reevaluation, and Consults:         Provider Notes (Medical Decision Making):   MDM  Number of Diagnoses or Management Options  Diagnosis management comments: Patient is a 79-year-old male with a hx  of A. fib, congestive heart failure and hypertension presenting due to scrotal edema/swelling. Patient presenting with scrotal edema and bilateral lower extremity edema up to thighs, concerning for edema is likely secondary to fluid overload. Patient without any respiratory distress. Plan to give IV Lasix, do ultrasound of scrotum, place [place] and admit for treatment with IV Lasix since patient is being edema even while taking his normal doses of p.o. Lasix. After discussion with urology I believe  is the best plan as patient will likely fail outpatient treatment with p.o. Lasix. Procedures    Critical Care Time:       Diagnosis     Clinical Impression: No diagnosis found. Disposition: Admission    Follow-up Information    None          Patient's Medications   Start Taking    No medications on file   Continue Taking    CARVEDILOL (COREG) 6.25 MG TABLET    Take 6.25 mg by mouth two (2) times a day. COMPR. STOCKING,KNEE,LONG,LARGE (COMP STOCKING,KNEE,LONG,LARGE) MISC    1 Package by Does Not Apply route daily. FLUOXETINE (PROZAC) 20 MG CAPSULE    Take 20 mg by mouth daily. FUROSEMIDE (LASIX) 40 MG TABLET    Take two tabs twice a day    OMEPRAZOLE (PRILOSEC) 20 MG CAPSULE    Take 20 mg by mouth daily. POTASSIUM CHLORIDE SR (K-TAB) 20 MEQ TABLET    Take 20 mEq by mouth daily. WARFARIN (COUMADIN) 5 MG TABLET    Take 1 tablet daily as directed by [facility]. These Medications have changed    No medications on file   Stop Taking    No medications on file     Disclaimer: Sections of this note are dictated using utilizing voice recognition software. Minor typographical errors may be present.  If questions arise, please do not hesitate to contact me or call our department.

## 2022-06-14 NOTE — ED TRIAGE NOTES
Patient c/o swelling to scrotum x one week. Referred to ER by [facility]. Denies injury.

## 2022-06-14 NOTE — ED NOTES
TRANSFER - OUT REPORT:    Verbal report given to [address] [place] [address] on Molly Graham  being transferred to [facility] 4N Room 462 for routine progression of care       Report consisted of patients Situation, Background, Assessment and   Recommendations(SBAR). Information from the following report(s) SBAR, ED Summary and Intake/Output was reviewed with the receiving nurse. Lines:   Peripheral IV 06/14/22 Left Hand (Active)   Site Assessment Clean, dry, & intact 06/14/22 1521   Phlebitis Assessment 0 06/14/22 1521   Infiltration Assessment 0 06/14/22 1521   Dressing Status Clean, dry, & intact 06/14/22 1521   Dressing Type Transparent 06/14/22 1521   Hub Color/Line Status Pink;Flushed;Patent 06/14/22 1521        Opportunity for questions and clarification was provided. Patient transported with: patient transferred with all his personal belongings at time of transport with [facility] care.

## 2022-06-14 NOTE — ED NOTES
[facility] at bedside preparing patient to be transferred to [facility] [address].

## 2022-06-14 NOTE — ED NOTES
Patient was fed dinner by ED tech. Patient resting comfortably on the stretcher with call bell within reach no signs or symptoms of acute distress noted.

## 2022-06-15 ENCOUNTER — APPOINTMENT (OUTPATIENT)
Dept: NON INVASIVE DIAGNOSTICS | Age: 72
DRG: 291 | End: 2022-06-15
Attending: NURSE PRACTITIONER
Payer: MEDICARE

## 2022-06-15 PROBLEM — I50.33 ACUTE ON CHRONIC DIASTOLIC HEART FAILURE (HCC): Status: ACTIVE | Noted: 2022-06-14

## 2022-06-15 PROBLEM — E87.6 HYPOKALEMIA: Status: ACTIVE | Noted: 2022-06-15

## 2022-06-15 LAB
ANION GAP SERPL CALC-SCNC: 4 MMOL/L (ref 3–18)
ATRIAL RATE: 62 BPM
ATRIAL RATE: 66 BPM
BUN SERPL-MCNC: 11 MG/DL (ref 7–18)
BUN/CREAT SERPL: 13 (ref 12–20)
CALCIUM SERPL-MCNC: 8.7 MG/DL (ref 8.5–10.1)
CALCULATED P AXIS, ECG09: 102 DEGREES
CALCULATED P AXIS, ECG09: 74 DEGREES
CALCULATED R AXIS, ECG10: 12 DEGREES
CALCULATED R AXIS, ECG10: 12 DEGREES
CALCULATED T AXIS, ECG11: 18 DEGREES
CALCULATED T AXIS, ECG11: 5 DEGREES
CHLORIDE SERPL-SCNC: 103 MMOL/L (ref 100–111)
CO2 SERPL-SCNC: 33 MMOL/L (ref 21–32)
CREAT SERPL-MCNC: 0.85 MG/DL (ref 0.6–1.3)
DIAGNOSIS, 93000: NORMAL
DIAGNOSIS, 93000: NORMAL
ECHO TV REGURGITANT MAX VELOCITY: 2.47 M/S
ECHO TV REGURGITANT PEAK GRADIENT: 24 MMHG
ERYTHROCYTE [DISTWIDTH] IN BLOOD BY AUTOMATED COUNT: 12.6 % (ref 11.6–14.5)
GLUCOSE SERPL-MCNC: 83 MG/DL (ref 74–99)
HCT VFR BLD AUTO: 40.5 % (ref 36–48)
HGB BLD-MCNC: 13.9 G/DL (ref 13–16)
INR PPP: 2.1 (ref 0.8–1.2)
MAGNESIUM SERPL-MCNC: 2.2 MG/DL (ref 1.6–2.6)
MCH RBC QN AUTO: 31.5 PG (ref 24–34)
MCHC RBC AUTO-ENTMCNC: 34.3 G/DL (ref 31–37)
MCV RBC AUTO: 91.8 FL (ref 78–100)
NRBC # BLD: 0 K/UL (ref 0–0.01)
NRBC BLD-RTO: 0 PER 100 WBC
P-R INTERVAL, ECG05: 218 MS
P-R INTERVAL, ECG05: 232 MS
PLATELET # BLD AUTO: 113 K/UL (ref 135–420)
PMV BLD AUTO: 11.9 FL (ref 9.2–11.8)
POTASSIUM SERPL-SCNC: 3.1 MMOL/L (ref 3.5–5.5)
PROCALCITONIN SERPL-MCNC: <0.05 NG/ML
PROTHROMBIN TIME: 23.6 SEC (ref 11.5–15.2)
Q-T INTERVAL, ECG07: 432 MS
Q-T INTERVAL, ECG07: 458 MS
QRS DURATION, ECG06: 100 MS
QRS DURATION, ECG06: 106 MS
QTC CALCULATION (BEZET), ECG08: 452 MS
QTC CALCULATION (BEZET), ECG08: 464 MS
RBC # BLD AUTO: 4.41 M/UL (ref 4.35–5.65)
SODIUM SERPL-SCNC: 140 MMOL/L (ref 136–145)
VENTRICULAR RATE, ECG03: 62 BPM
VENTRICULAR RATE, ECG03: 66 BPM
WBC # BLD AUTO: 5.4 K/UL (ref 4.6–13.2)

## 2022-06-15 PROCEDURE — 85027 COMPLETE CBC AUTOMATED: CPT

## 2022-06-15 PROCEDURE — 74011250636 HC RX REV CODE- 250/636: Performed by: NURSE PRACTITIONER

## 2022-06-15 PROCEDURE — 80048 BASIC METABOLIC PNL TOTAL CA: CPT

## 2022-06-15 PROCEDURE — 85610 PROTHROMBIN TIME: CPT

## 2022-06-15 PROCEDURE — 83735 ASSAY OF MAGNESIUM: CPT

## 2022-06-15 PROCEDURE — APPSS30 APP SPLIT SHARED TIME 16-30 MINUTES: Performed by: PHYSICIAN ASSISTANT

## 2022-06-15 PROCEDURE — 36415 COLL VENOUS BLD VENIPUNCTURE: CPT

## 2022-06-15 PROCEDURE — 97116 GAIT TRAINING THERAPY: CPT

## 2022-06-15 PROCEDURE — 74011250637 HC RX REV CODE- 250/637: Performed by: PHYSICIAN ASSISTANT

## 2022-06-15 PROCEDURE — 74011250636 HC RX REV CODE- 250/636: Performed by: FAMILY MEDICINE

## 2022-06-15 PROCEDURE — 97166 OT EVAL MOD COMPLEX 45 MIN: CPT

## 2022-06-15 PROCEDURE — 99223 1ST HOSP IP/OBS HIGH 75: CPT | Performed by: INTERNAL MEDICINE

## 2022-06-15 PROCEDURE — 99232 SBSQ HOSP IP/OBS MODERATE 35: CPT | Performed by: FAMILY MEDICINE

## 2022-06-15 PROCEDURE — 74011000250 HC RX REV CODE- 250: Performed by: NURSE PRACTITIONER

## 2022-06-15 PROCEDURE — 93005 ELECTROCARDIOGRAM TRACING: CPT

## 2022-06-15 PROCEDURE — 2709999900 HC NON-CHARGEABLE SUPPLY

## 2022-06-15 PROCEDURE — 97535 SELF CARE MNGMENT TRAINING: CPT

## 2022-06-15 PROCEDURE — C8923 2D TTE W OR W/O FOL W/CON,CO: HCPCS

## 2022-06-15 PROCEDURE — 84145 PROCALCITONIN (PCT): CPT

## 2022-06-15 PROCEDURE — 97161 PT EVAL LOW COMPLEX 20 MIN: CPT

## 2022-06-15 PROCEDURE — 74011250637 HC RX REV CODE- 250/637: Performed by: NURSE PRACTITIONER

## 2022-06-15 PROCEDURE — C8929 TTE W OR WO FOL WCON,DOPPLER: HCPCS

## 2022-06-15 PROCEDURE — 65270000046 HC RM TELEMETRY

## 2022-06-15 RX ORDER — WARFARIN SODIUM 5 MG/1
5 TABLET ORAL
Status: COMPLETED | OUTPATIENT
Start: 2022-06-15 | End: 2022-06-15

## 2022-06-15 RX ORDER — POTASSIUM CHLORIDE 20 MEQ/1
40 TABLET, EXTENDED RELEASE ORAL
Status: COMPLETED | OUTPATIENT
Start: 2022-06-15 | End: 2022-06-15

## 2022-06-15 RX ADMIN — CARVEDILOL 6.25 MG: 6.25 TABLET, FILM COATED ORAL at 09:35

## 2022-06-15 RX ADMIN — WARFARIN SODIUM 5 MG: 5 TABLET ORAL at 21:37

## 2022-06-15 RX ADMIN — CARVEDILOL 6.25 MG: 6.25 TABLET, FILM COATED ORAL at 18:07

## 2022-06-15 RX ADMIN — ACETAMINOPHEN 650 MG: 325 TABLET ORAL at 09:35

## 2022-06-15 RX ADMIN — FLUOXETINE 20 MG: 20 CAPSULE ORAL at 09:35

## 2022-06-15 RX ADMIN — SODIUM CHLORIDE, PRESERVATIVE FREE 10 ML: 5 INJECTION INTRAVENOUS at 05:06

## 2022-06-15 RX ADMIN — POTASSIUM CHLORIDE 40 MEQ: 1500 TABLET, EXTENDED RELEASE ORAL at 10:56

## 2022-06-15 RX ADMIN — PANTOPRAZOLE SODIUM 40 MG: 40 TABLET, DELAYED RELEASE ORAL at 09:35

## 2022-06-15 RX ADMIN — SODIUM CHLORIDE, PRESERVATIVE FREE 10 ML: 5 INJECTION INTRAVENOUS at 14:50

## 2022-06-15 RX ADMIN — FUROSEMIDE 40 MG: 10 INJECTION, SOLUTION INTRAMUSCULAR; INTRAVENOUS at 09:35

## 2022-06-15 RX ADMIN — FUROSEMIDE 40 MG: 10 INJECTION, SOLUTION INTRAMUSCULAR; INTRAVENOUS at 18:07

## 2022-06-15 RX ADMIN — PERFLUTREN 2 ML: 6.52 INJECTION, SUSPENSION INTRAVENOUS at 11:20

## 2022-06-15 RX ADMIN — SODIUM CHLORIDE, PRESERVATIVE FREE 10 ML: 5 INJECTION INTRAVENOUS at 21:38

## 2022-06-15 NOTE — PROGRESS NOTES
Problem: Self Care Deficits Care Plan (Adult)  Goal: *Acute Goals and Plan of Care (Insert Text)  Outcome: Resolved/Not Met   OCCUPATIONAL THERAPY EVALUATION/DISCHARGE    Patient: Cristobal Irby (49 y.o. male)  Date: 6/15/2022  Primary Diagnosis: Fluid overload [E87.70]  Scrotal edema [N50.89]  Bilateral lower extremity pain [M79.604, M79.605]        Precautions:   Skin,Fall  PLOF: pt reports staff at group home assist with self feeding and ADLs, Supv to walk to bathroom and/or walk in shower with seat using wall for balance     ASSESSMENT AND RECOMMENDATIONS:  Nursing/RN cleared for pt to participate in OT evaluation and tx session. Patient presents lying supine in bed, A & O x 4, c/o 7/10 scrotum pain d/t edema. Bed mobility: Mod I supine <-> sit edge of bed. BUE AROM: WFL, although chronic tremors d/t PD inhibit pt with FM tasks e.g. self feeding, in which he required total assist at [facility] and currently. LB dress: dep doff and don slipper with back lying supine. Simulated bedside commode transfer: SBA using RW with good balance, pt reports difficulty with ambulation d/t scrotal edema. Patient resting semi-reclined in bed at end of tx session, Patient verbalized understanding to utilize call bell to request assist as needed. Nursing present and aware of level of assist for bedside commode transfer using RW and dry erase communication board updated. Patient presents close at baseline as PLOF with ADLs and functional mobility. Patient verbalized understanding of skilled OT services not indicated at this time while in acute hospital.     Skilled occupational therapy is not indicated at this time.   Discharge Recommendations: back to group home with continued assist e.g. ADLs  Further Equipment Recommendations for Discharge: bedside commode and rolling walker for energy conservation and fall prevention     SUBJECTIVE:   Patient stated I really need to be using a walker, I hold onto the walls when I walk.    OBJECTIVE DATA SUMMARY:     Past Medical History:   Diagnosis Date    [facility]fib ([facility]     Bronchitis     Cardiomyopathy ([facility]     CHF (congestive heart failure) (HCC)     DVT (deep venous thrombosis) (HCC)     Essential tremor     Exposure to [employer] GERD (gastroesophageal reflux disease)     History of pulmonary embolus (PE)     HTN (hypertension)     Major depressive disorder     Obesity     Parkinson's disease ([facility]     PVD (peripheral vascular disease) ([facility]     Vitamin D deficiency      Past Surgical History:   Procedure Laterality Date    NEUROLOGICAL PROCEDURE UNLISTED      Neurostimulator to brain. Barriers to Learning/Limitations: None  Compensate with: visual, verbal, tactile, kinesthetic cues/model    Home Situation:   Home Situation  Home Environment: Group home  One/Two Story Residence: One story  Living Alone: No  Support Systems: Adult Group Home  Patient Expects to be Discharged to[de-identified] Group home  Current DME Used/Available at Home: Shower chair,Grab bars  Tub or Shower Type: Shower  []     Right hand dominant   []     Left hand dominant    Cognitive/Behavioral Status:  Neurologic State: Alert; Appropriate for age  Orientation Level: Oriented X4  Cognition: Follows commands  Safety/Judgement: Fall prevention    Skin: appears intact  Edema: scrotum    Vision/Perceptual:  appears intact, able to tell correct time on wall clock  Corrective Lenses: Glasses    Coordination: BUE  Coordination: Generally decreased, functional (impaired for FM tasks e.g. self feeding d/t tremors)  Fine Motor Skills-Upper: Left Impaired;Right Impaired (tremors d/t PD)    Gross Motor Skills-Upper: Left Intact; Right Intact (impaired for FM tasks e.g. self feeding d/t tremors)    Balance:  Sitting: Impaired  Sitting - Static: Good (unsupported)  Sitting - Dynamic: Fair (occasional)  Standing: Impaired; With support  Standing - Static: Good  Standing - Dynamic : Good    Strength: BUE  Strength: Generally decreased, functional     Tone & Sensation: BUE  Tone: Normal     Range of Motion: BUE  AROM: Generally decreased, functional     Functional Mobility and Transfers for ADLs:  Bed Mobility:     Supine to Sit: Modified independent  Sit to Supine: Modified independent     Transfers:  Sit to Stand: Supervision  Stand to Sit: Supervision  ADL Assessment:  Feeding: Total assistance    Oral Facial Hygiene/Grooming: Moderate assistance    Bathing: Maximum assistance; Total assistance    Upper Body Dressing: Moderate assistance    Lower Body Dressing: Total assistance    Toileting: Maximum assistance     ADL Intervention:  Upper Body [address]: Moderate assistance    Lower Body Dressing Assistance  Slip on Shoes with Back: Total assistance (dependent)  Cognitive Retraining  Safety/Judgement: Fall prevention    Pain:  Pain level pre-treatment: 7/10   Pain level post-treatment: 7/10   Pain Intervention(s): Medication (see MAR); Rest, Ice, Repositioning   Response to intervention: Nurse notified, See doc flow    Activity Tolerance:   fair  Please refer to the flowsheet for vital signs taken during this treatment. After treatment:   []  Patient left in no apparent distress sitting up in chair  [x]  Patient left in no apparent distress in bed  [x]  Call bell left within reach  [x]  Nursing notified  []  Caregiver present  [x]  Bed alarm activated    COMMUNICATION/EDUCATION:   [x]      Role of Occupational Therapy in the acute care setting  [x]      Home safety education was provided and the patient/caregiver indicated understanding. [x]      Patient/family have participated as able and agree with findings and recommendations. []      Patient is unable to participate in plan of care at this time.     Thank you for this referral.  Ova Ramal  Time Calculation: 16 mins      Eval Complexity: History: MEDIUM Complexity : Expanded review of history including physical, cognitive and psychosocial  history ; Examination: MEDIUM Complexity : 3-5 performance deficits relating to physical, cognitive , or psychosocial skils that result in activity limitations and / or participation restrictions; Decision Making:MEDIUM Complexity : Patient may present with comorbidities that affect occupational performnce.  Miniml to moderate modification of tasks or assistance (eg, physical or verbal ) with assesment(s) is necessary to enable patient to complete evaluation

## 2022-06-15 NOTE — PROGRESS NOTES
Problem: Falls - Risk of  Goal: *Absence of Falls  Description: Document Erica Mckeon Fall Risk and appropriate interventions in the flowsheet.   Outcome: Progressing Towards Goal  Note: Fall Risk Interventions:  Mobility Interventions: PT Consult for mobility concerns         Medication Interventions: Bed/chair exit alarm,Patient to call before getting OOB    Elimination Interventions: Call light in reach,Bed/chair exit alarm    History of Falls Interventions: Bed/chair exit alarm,Vital signs minimum Q4HRs X 24 hrs (comment for end date)         Problem: Pain  Goal: *Control of Pain  Outcome: Progressing Towards Goal     Problem: Patient Education: Go to Patient Education Activity  Goal: Patient/Family Education  Outcome: Progressing Towards Goal

## 2022-06-15 NOTE — PROGRESS NOTES
Wound Prevention Checklist    Patient: Keya Aparicio (65 y.o. male)  Date: 6/15/2022  Diagnosis: Fluid overload [E87.70]  Scrotal edema [N50.89]  Bilateral lower extremity pain [M79.604, M79.605] <principal problem not specified>    Precautions: Skin,Fall       [x]  Heel prevention boots placed on patient    [x]  Patient turned q2h during shift    [x]  Lift team ordered    [x]  Patient on Anne bed/Specialty bed    []  Each Wound is documented during shift (Stage, Color, drainage, odor, measurements, and dressings)    [x]  Dual skin checks done at bedside during shift report with MARV [place]

## 2022-06-15 NOTE — PROGRESS NOTES
PT orders received and chart reviewed. PT eval attempted at 1016. Pt currently off floor at ECHO. Will follow up. 2nd attempt at 1111, pt declining to participate, would like to rest. Will continue to follow.      Thank you for this referral.   Jacob Brock PT DPT

## 2022-06-15 NOTE — PROGRESS NOTES
conducted an initial consultation and Spiritual Assessment for José Luis Orozco, who is a 67 y. o.,male. Patients Primary Language is: [place]. According to the patients EMR Pentecostal Affiliation is: Non Tenriism.     The reason the Patient came to the hospital is:   Patient Active Problem List    Diagnosis Date Noted    Scrotal edema 06/14/2022    Fluid overload 06/14/2022    Bilateral lower extremity pain 06/14/2022    Parkinson's disease ([facility] 02/23/2022    Peripheral vascular disease, unspecified ([facility] 02/23/2022    Chronic depression 08/25/2021    Paroxysmal atrial fibrillation ([facility] 08/25/2020    Class 2 severe obesity due to excess calories with serious comorbidity in adult [facility]) 05/03/2019    Recurrent major depressive disorder, in partial remission ([facility] 02/26/2019    Chronic diastolic heart failure ([facility] 02/21/2019    Obesity (BMI 30-39.9) 02/21/2019    H/O alcohol abuse 02/21/2019    Sleep apnea 02/21/2019    Essential hypertension 01/31/2018        The  provided the following Interventions:  Initiated a relationship of care and support. Explored issues of karly, belief, spirituality and Hindu/ritual needs while hospitalized. Listened empathically. Provided chaplaincy education. Provided information about Spiritual Care Services. Offered prayer and assurance of continued prayers on patient's behalf. Chart reviewed. The following outcomes where achieved:  Patient shared limited information about both his/her medical narrative and spiritual journey/beliefs. Patient shared that he now lives in a group home  [facility]) where there's 12 hour caregiver that helps him with ADLS. His daughter and his two grandsons are his support system.  confirmed Patient's Non-Tenriism Pentecostal Affiliation. Patient processed feeling about current hospitalization. Patient expressed gratitude for 's visit.     Assessment:  Patient does not have any Jew/cultural needs that will affect patients preferences in health care. There are no spiritual or Jew issues which require intervention at this time. Plan:  Chaplains will continue to follow and will provide pastoral care on an as needed/requested basis.  recommends bedside caregivers page  on duty if patient shows signs of acute spiritual or emotional distress.     Trinda Goldberg, [address]   [phone]

## 2022-06-15 NOTE — PROGRESS NOTES
[facility] Hospitalist Group  Progress Note    Patient: Garo Olivares Age: 67 y.o. : 1950 MR#: [id number] SSN: [id number]  Date: 6/15/2022         Assessment/Plan:     Hospital Problems  Never Reviewed          Codes Class Noted POA    Hypokalemia ICD-10-CM: E87.6  ICD-9-CM: 276.8  6/15/2022 No        Scrotal edema ICD-10-CM: N50.89  ICD-9-CM: 608.86  2022 Unknown        * (Principal) Acute on chronic diastolic heart failure (HCC) ICD-10-CM: I50.33  ICD-9-CM: 428.33  2022 Yes        Bilateral lower extremity pain ICD-10-CM: M79.604, M79.605  ICD-9-CM: 729.5  2022 Unknown        Paroxysmal atrial fibrillation (HCC) ICD-10-CM: I48.0  ICD-9-CM: 427.31  2020 Yes            Acute on chronic HFpEF  - IV diuresis  - monitor BMP  - strict I&Os, daily weight  - cont home meds  - echo noted  - appreciate cardiology assistance    Scrotal edema: 2/2 HF. Pain and swelling improving with diuresis. No s/sx of infection    PAF  - .cont coreg, resume warfarin  - daily INRs    Hypokalemia  - replace as needed and monitor         DVT Prophylaxis:  []Lovenox  []Hep SQ  []SCDs  [x]Coumadin   []On Heparin gtt []PO anticoagulant    Anticipated discharge: 1-2 days    Subjective:     Pt s/e @ bedside. No major events overnight. Pt stated pain and swelling have improved. He does report significant weight gain since being at the group home despite eating smaller meals. Denies cp, sob, abd pain, nvd.     Objective:   VS:   Visit Vitals  /61 (BP 1 Location: Left upper arm, BP Patient Position: Semi fowlers)   Pulse 67   Temp 97.3 °F (36.3 °C)   Resp 19   Ht 6' (1.829 m)   Wt 149.7 kg (330 lb)   SpO2 93%   BMI 44.76 kg/m²      Tmax/24hrs: Temp (24hrs), Av.5 °F (36.4 °C), Min:97 °F (36.1 °C), Max:97.8 °F (36.6 °C)      Intake/Output Summary (Last 24 hours) at 6/15/2022 1946  Last data filed at 6/15/2022 1507  Gross per 24 hour   Intake 480 ml   Output 2700 ml   Net -2220 ml General Appearance: NAD, conversant  HENT: normocephalic/atraumatic, moist mucus membranes  Neck: No JVD, supple  Lungs: CTA with normal respiratory effort  CV: RRR, no m/r/g  Abdomen: soft, non-tender, normal bowel sounds  : scrotum is diffusely edematous with no erythema or visible open wounds, mildly tender  Extremities: no cyanosis, no peripheral edema  Neuro: No focal deficits, motor/sensory intact  Skin: Normal color, intact  Psych: appropriate affec    Current Facility-Administered Medications   Medication Dose Route Frequency    WARFARIN INFORMATION NOTE (COUMADIN)   Other Q24H    warfarin (COUMADIN) tablet 5 mg  5 mg Oral NOW    sodium chloride (NS) flush 5-40 mL  5-40 mL IntraVENous Q8H    sodium chloride (NS) flush 5-40 mL  5-40 mL IntraVENous PRN    acetaminophen (TYLENOL) tablet 650 mg  650 mg Oral Q6H PRN    Or    acetaminophen (TYLENOL) suppository 650 mg  650 mg Rectal Q6H PRN    polyethylene glycol (MIRALAX) packet 17 g  17 g Oral DAILY PRN    ondansetron (ZOFRAN ODT) tablet 4 mg  4 mg Oral Q8H PRN    Or    ondansetron (ZOFRAN) injection 4 mg  4 mg IntraVENous Q6H PRN    furosemide (LASIX) injection 40 mg  40 mg IntraVENous BID    carvediloL (COREG) tablet 6.25 mg  6.25 mg Oral BID    FLUoxetine (PROzac) capsule 20 mg  20 mg Oral DAILY    pantoprazole (PROTONIX) tablet 40 mg  40 mg Oral ACB        Labs:    Recent Results (from the past 24 hour(s))   HEPATIC FUNCTION PANEL    Collection Time: 06/14/22  8:10 PM   Result Value Ref Range    Protein, total 7.1 6.4 - 8.2 g/dL    Albumin 2.9 (L) 3.4 - 5.0 g/dL    Globulin 4.2 (H) 2.0 - 4.0 g/dL    A-G Ratio 0.7 (L) 0.8 - 1.7      Bilirubin, total 1.5 (H) 0.2 - 1.0 MG/DL    Bilirubin, direct 0.4 (H) 0.0 - 0.2 MG/DL    Alk.  phosphatase 122 (H) 45 - 117 U/L    AST (SGOT) 30 10 - 38 U/L    ALT (SGPT) 18 16 - 61 U/L   EKG, 12 LEAD, INITIAL    Collection Time: 06/14/22  8:55 PM   Result Value Ref Range    Ventricular Rate 66 BPM    Atrial Rate 66 BPM    P-R Interval 232 ms    QRS Duration 100 ms    Q-T Interval 432 ms    QTC Calculation (Bezet) 452 ms    Calculated P Axis 74 degrees    Calculated R Axis 12 degrees    Calculated T Axis 5 degrees    Diagnosis       Sinus rhythm with 1st degree AV block  Otherwise normal ECG  No previous ECGs available  Confirmed by Phong Prajapati M.D., [facility] ([id number]) on 6/15/2022 4:55:35 PM     PROTHROMBIN TIME + INR    Collection Time: 06/14/22  9:05 PM   Result Value Ref Range    Prothrombin time 23.5 (H) 11.5 - 15.2 sec    INR 2.1 (H) 0.8 - 1.2     METABOLIC PANEL, BASIC    Collection Time: 06/15/22  2:46 AM   Result Value Ref Range    Sodium 140 136 - 145 mmol/L    Potassium 3.1 (L) 3.5 - 5.5 mmol/L    Chloride 103 100 - 111 mmol/L    CO2 33 (H) 21 - 32 mmol/L    Anion gap 4 3.0 - 18 mmol/L    Glucose 83 74 - 99 mg/dL    BUN 11 7.0 - 18 MG/DL    Creatinine 0.85 0.6 - 1.3 MG/DL    BUN/Creatinine ratio 13 12 - 20      GFR est AA >60 >60 ml/min/1.73m2    GFR est non-AA >60 >60 ml/min/1.73m2    Calcium 8.7 8.5 - 10.1 MG/DL   MAGNESIUM    Collection Time: 06/15/22  2:46 AM   Result Value Ref Range    Magnesium 2.2 1.6 - 2.6 mg/dL   CBC W/O DIFF    Collection Time: 06/15/22  2:46 AM   Result Value Ref Range    WBC 5.4 4.6 - 13.2 K/uL    RBC 4.41 4.35 - 5.65 M/uL    HGB 13.9 13.0 - 16.0 g/dL    HCT 40.5 36.0 - 48.0 %    MCV 91.8 78.0 - 100.0 FL    MCH 31.5 24.0 - 34.0 PG    MCHC 34.3 31.0 - 37.0 g/dL    RDW 12.6 11.6 - 14.5 %    PLATELET 366 (L) 553 - 420 K/uL    MPV 11.9 (H) 9.2 - 11.8 FL    NRBC 0.0 0  WBC    ABSOLUTE NRBC 0.00 0.00 - 0.01 K/uL   PROTHROMBIN TIME + INR    Collection Time: 06/15/22  2:46 AM   Result Value Ref Range    Prothrombin time 23.6 (H) 11.5 - 15.2 sec    INR 2.1 (H) 0.8 - 1.2     PROCALCITONIN    Collection Time: 06/15/22  2:46 AM   Result Value Ref Range    Procalcitonin <0.05 ng/mL   EKG, 12 LEAD, INITIAL    Collection Time: 06/15/22  9:10 AM   Result Value Ref Range    Ventricular Rate 62 BPM Atrial Rate 62 BPM    P-R Interval 218 ms    QRS Duration 106 ms    Q-T Interval 458 ms    QTC Calculation (Bezet) 464 ms    Calculated P Axis 102 degrees    Calculated R Axis 12 degrees    Calculated T Axis 18 degrees    Diagnosis       Sinus rhythm with 1st degree AV block  Otherwise normal ECG  When compared with ECG of 14-JUN-2022 20:55,  No significant change was found  Confirmed by Xavi Morrison M.D., [address] [place] [address] ([id number]) on 6/15/2022 5:12:15 PM     ECHO ADULT FOLLOW-UP OR LIMITED    Collection Time: 06/15/22 10:23 AM   Result Value Ref Range    TR Peak Gradient 24 mmHg    TR Max Velocity 2.47 m/s       Imaging:  ECHO ADULT FOLLOW-UP OR LIMITED    Result Date: 6/15/2022    Contrast used: Definity.   Technical qualifiers: Echo study was technically difficult due to patient's body habitus.   Left Ventricle: Normal left ventricular systolic function with a visually estimated EF of 55 - 60%. Not well visualized. Unable to assess wall thickness. Normal wall motion.   Right Ventricle: Not assessed due to poor image quality.   Pericardium: There is evidence of epicardial fat. Trivial pericardial effusion present. No indication of cardiac tamponade.          Signed By: [facility]  Hospitalist Division  Office:  [phone]  Pager: [phone]         [place]sley 15, 2022 7:46 PM

## 2022-06-15 NOTE — PROGRESS NOTES
Problem: Mobility Impaired (Adult and Pediatric)  Goal: *Acute Goals and Plan of Care (Insert Text)  Description: Physical Therapy Goals  Initiated 6/15/2022 and to be accomplished within 7 day(s)  1. Patient will move from supine to sit and sit to supine , scoot up and down, and roll side to side in bed with modified independence. 2.  Patient will transfer from bed to chair and chair to bed with modified independence using the least restrictive device. 3.  Patient will perform sit to stand with modified independence. 4.  Patient will ambulate with modified independence for 50 feet with the least restrictive device. 5.  Patient will ascend/descend 4 stairs with R handrail(s) with supervision/set-up. PLOF: Pt reporting independent with mobility in 2 story group home with room on first floor. Pt reports having 4 DB with R handrail. Pt reports needing assist for feeding due to tremors and gets assist with bathing with shower chair. Outcome: Progressing Towards Goal   PHYSICAL THERAPY EVALUATION    Patient: Chad Velez (49 y.o. male)  Date: 6/15/2022  Primary Diagnosis: Fluid overload [E87.70]  Scrotal edema [N50.89]  Bilateral lower extremity pain [M79.604, M79.605]        Precautions:  Skin,Fall    ASSESSMENT :  Pt cleared to participate in PT session, pt received semi-reclined in bed and agreeable to therapy session. Based on the objective data described below, the patient presents with decreased endurance, decreased strength, decreased balance reactions, gait deviations, BUE tremors, and decreased independence in functional mobility. Pt completing bed mobility with SBA, HOB elevated and use of bedrails. Pt sitting on EOB with good balance, 4/5 LE strength. Pt needing assist to scoot towards EOB with Heide and UE support. Standing with Heide to RW, poor safety awareness, attempting to walk around RW, verbal and tactile cues for appropriate positioning. Sitting in recliner with supervision.  Pt requesting to return to supine in bed, ambulating with Heide and RW back to bed x10 feet. SBA back to bed. Pt positioned for comfort and educated to call for assist before getting up, pt verbalized understanding. Pt left with all needs met and call bell in reach. RN notified of position and participation. Patient will benefit from skilled intervention to address the above impairments. Patient's rehabilitation potential is considered to be Fair  Factors which may influence rehabilitation potential include:   []         None noted  []         Mental ability/status  [x]         Medical condition  [x]         Home/family situation and support systems  []         Safety awareness  []         Pain tolerance/management  []         Other:      PLAN :  Recommendations and Planned Interventions:   [x]           Bed Mobility Training             []    Neuromuscular Re-Education  [x]           Transfer Training                   []    Orthotic/Prosthetic Training  [x]           Gait Training                          []    Modalities  [x]           Therapeutic Exercises           []    Edema Management/Control  [x]           Therapeutic Activities            [x]    Family Training/Education  [x]           Patient Education  []           Other (comment):    Frequency/Duration: Patient will be followed by physical therapy 1-2 times per day/4-7 days per week to address goals. Discharge Recommendations: Rehab  Further Equipment Recommendations for Discharge: rolling walker     SUBJECTIVE:   Patient stated I have been at the group home about a month.     OBJECTIVE DATA SUMMARY:     Past Medical History:   Diagnosis Date    A[facility]fib [facility]     Bronchitis     Cardiomyopathy ([facility]     CHF (congestive heart failure) (HCC)     DVT (deep venous thrombosis) (HCC)     Essential tremor     Exposure to Agent Orange     GERD (gastroesophageal reflux disease)     History of pulmonary embolus (PE)     HTN (hypertension)     Major depressive disorder     Obesity     Parkinson's disease ([facility]     PVD (peripheral vascular disease) ([facility]     Vitamin D deficiency      Past Surgical History:   Procedure Laterality Date    NEUROLOGICAL PROCEDURE UNLISTED      Neurostimulator to brain. Barriers to Learning/Limitations: None  Compensate with: N/A  Home Situation:  Home Situation  Home Environment: Group home  # Steps to Enter: 4  Rails to Enter: Yes  Hand Rails : Right  Wheelchair Ramp: No  One/Two Story Residence: Two story, live on 1st floor  Living Alone: No  Support Systems: Adult Group Home  Patient Expects to be Discharged to[de-identified] Group home  Current DME Used/Available at Home: Shower chair,Grab bars  Tub or Shower Type: Shower  Critical Behavior:  Neurologic State: Alert  Orientation Level: Oriented X4  Cognition: Appropriate decision making  Safety/Judgement: Awareness of environment; Fall prevention  Psychosocial  Patient Behaviors: Calm;Demanding  Purposeful Interaction: Yes  Pt Identified Daily Priority: Clinical issues (comment)  Caritas Process: Establish trust;Attend basic human needs;Create healing environment;Nurture loving kindness  Caring Interventions: Therapeutic modalities; Reassure  Reassure: Caring rounds; Therapeutic listening  Therapeutic Modalities: Intentional therapeutic touch  Skin Condition/Temp: Warm  Skin Integumentary  Skin Color: Pale;Red  Skin Condition/Temp: Warm  Turgor: Non-tenting  Hair Growth: Present  Nails: Within Defined Limits  Strength:    Strength: Generally decreased, functional  Tone & Sensation:   Tone: Normal  Sensation: Intact  Range Of Motion:  AROM: Generally decreased, functional  Posture:  Posture (WDL): Within defined limits  Functional Mobility:  Bed Mobility:  Supine to Sit: Stand-by assistance; Additional time;Bed Modified  Sit to Supine: Stand-by assistance  Scooting: Minimum assistance  Transfers:  Sit to Stand: Minimum assistance  Stand to Sit: Contact guard assistance  Stand Pivot Transfers: Minimum assistance                 Balance:   Sitting: Intact  Sitting - Static: Good (unsupported)  Sitting - Dynamic: Good (unsupported)  Standing: Impaired; With support  Standing - Static: Fair  Standing - Dynamic : Fair  Ambulation/Gait Training:  Distance (ft): 10 Feet (ft)  Assistive Device: Walker, rolling  Ambulation - Level of Assistance: Minimal assistance  Gait Description (WDL): Exceptions to WDL  Gait Abnormalities: Decreased step clearance  Base of Support: Center of gravity altered; Widened  Speed/Evelina: Fluctuations; Shuffled  Step Length: Right shortened;Left shortened    Pain:  Pain level pre-treatment: 0/10   Pain level post-treatment: 0/10   FLACC     Activity Tolerance:   Fair tolerance. Please refer to the flowsheet for vital signs taken during this treatment. After treatment:   []         Patient left in no apparent distress sitting up in chair  [x]         Patient left in no apparent distress in bed  [x]         Call bell left within reach  [x]         Nursing notified  []         Caregiver present  []         Bed alarm activated  []         SCDs applied    COMMUNICATION/EDUCATION:   [x]         Role of Physical Therapy in the acute care setting. [x]         Fall prevention education was provided and the patient/caregiver indicated understanding. [x]         Patient/family have participated as able in goal setting and plan of care. [x]         Patient/family agree to work toward stated goals and plan of care. []         Patient understands intent and goals of therapy, but is neutral about his/her participation. []         Patient is unable to participate in goal setting/plan of care: ongoing with therapy staff.  []         Other:     Thank you for this referral.  Baljit Coates, PT   Time Calculation: 23 mins      Eval Complexity: History: MEDIUM  Complexity : 1-2 comorbidities / personal factors will impact the outcome/ POC Exam:LOW Complexity : 1-2 Standardized tests and measures addressing body structure, function, activity limitation and / or participation in recreation  Presentation: LOW Complexity : Stable, uncomplicated  Clinical Decision Making:Low Complexity low  Overall Complexity:LOW

## 2022-06-15 NOTE — CONSULTS
Cardiology Consult Note    Consultation request by Savanna Diaz MD for advice/opinion related to evaluating     Date of  Admission: 6/14/2022  1:47 PM   Primary Care Physician:  Lilian Lee MD       Assessment:     -Admitted for scrotal edema and concern with fluid overload. Albumin is 2.4, hypoalbuminemia contributing factor as well  -History of possible HFpEF. Last echo in 2021 with normal EF and normal diastolic function at [facility]  - NST 1/2019: negative for ischemia/infarct. EF 78%. -Mild to moderate MR based on echo in 2021  - Paroxysmal atrial fibrillation. On chronic warfarin.  - Hypertension  - Tremor (debillitating). - Sleep apnea. - H/o ETOH abuse (stopped 8/2019). - Hx of cocaine, marijuana in the 90's. -Morbid obesity  -Current major depression     Primary cardiologist : Garth Hermosillo     Plan:     Patient admitted with scrotal edema for the last couple weeks and somewhat pain. Ultrasound has been unremarkable for any torsion  Patient been having some difficult ambulating because of scrotal swelling  Currently without any symptoms concerning for unstable angina  Exam suggest fluid overload with edema, scrotal edema as well as minimal bibasilar Rales  Echocardiogram in 2021 with normal systolic and diastolic function. Moderate MR  BNP is normal  EG with sinus rhythm    Recommendation:  -Continue with IV diuresis depending on renal function  -We will check echo again today to evaluate systolic or diastolic dysfunction and to evaluate MR  -Continue anticoagulation and Coreg     History of Present Illness: This is a 67 y.o. male admitted for Fluid overload [E87.70]; Scrotal edema [N50.89]; Bilateral lower extremity pain [M79.604, M79.605]. Patient complains of: Toe swelling    Son Haq is a 67 y.o.    male with PMH of diastolic CHF, hypertension, severe obesity, essential tremor, paroxysmal A. fib, history of alcohol abuse, recurrent major depression, who presents with scrotal edema present for the last 7 days. Patient states he has been compliant with his Lasix at home however despite this scrotal swelling progressed and got \"worse. He also had some lower EXTR swelling. Denies any chest pain or chest tightness. He has essential tremors. He denies presyncope or syncope  Denies any prior history of MI or any coronary stent  Since hospital admission, patient has been feeling slightly better. Scrotal discomfort is better    Review of Symptoms:  Except as stated above include:  Constitutional:  negative  Respiratory:  negative  Cardiovascular:  negative  Gastrointestinal: negative  Genitourinary:  negative scrotal swelling and discomfort  Musculoskeletal:  Negative  Neurological:  Negative  Dermatological:  Negative  Endocrinological: Negative  Psychological:  Negative    A comprehensive review of systems was negative except for that written in the HPI. Past Medical History:     Past Medical History:   Diagnosis Date    [facility]     Bronchitis     Cardiomyopathy [facility]     [facility]     DVT (deep venous thrombosis) ([facility])     Essential tremor     Exposure to Agent [employer] GERD (gastroesophageal reflux disease)     History of pulmonary embolus (PE)     HTN (hypertension)     Major depressive disorder     Obesity     Parkinson's disease ([facility]     PVD [facility]peripheral vascular disease) ([facility]     Vitamin D deficiency          Social History:     Social History     Socioeconomic History    Marital status: LEGALLY    Tobacco Use    Smoking status: Former Smoker     Quit date:      Years since quittin.4   Substance and Sexual Activity    Alcohol use: Not Currently    Drug use: Not Currently        Family History:   No family history on file. Medications:      Allergies   Allergen Reactions    Penicillins Unknown (comments)     Patient was told he was allergic since he was an infant        Current Facility-Administered Medications   Medication Dose Route Frequency    potassium chloride (K-DUR, KLOR-CON M20) SR tablet 40 mEq  40 mEq Oral NOW    perflutren lipid microspheres (DEFINITY) contrast injection 2 mL  2 mL IntraVENous CARD ONCE    sodium chloride (NS) flush 5-40 mL  5-40 mL IntraVENous Q8H    sodium chloride (NS) flush 5-40 mL  5-40 mL IntraVENous PRN    acetaminophen (TYLENOL) tablet 650 mg  650 mg Oral Q6H PRN    Or    acetaminophen (TYLENOL) suppository 650 mg  650 mg Rectal Q6H PRN    polyethylene glycol (MIRALAX) packet 17 g  17 g Oral DAILY PRN    ondansetron (ZOFRAN ODT) tablet 4 mg  4 mg Oral Q8H PRN    Or    ondansetron (ZOFRAN) injection 4 mg  4 mg IntraVENous Q6H PRN    furosemide (LASIX) injection 40 mg  40 mg IntraVENous BID    carvediloL (COREG) tablet 6.25 mg  6.25 mg Oral BID    FLUoxetine (PROzac) capsule 20 mg  20 mg Oral DAILY    pantoprazole (PROTONIX) tablet 40 mg  40 mg Oral ACB         Physical Exam:     Visit Vitals  /66   Pulse 67   Temp 97.7 °F (36.5 °C)   Resp 20   Ht 6' (1.829 m)   Wt 149.7 kg (330 lb)   SpO2 95%   BMI 44.76 kg/m²       TELE: normal sinus rhythm    BP Readings from Last 3 Encounters:   06/15/22 132/66   09/15/18 146/70     Pulse Readings from Last 3 Encounters:   06/15/22 67   09/15/18 76     Wt Readings from Last 3 Encounters:   06/15/22 149.7 kg (330 lb)   09/15/18 140.2 kg (309 lb)       General:  alert, cooperative, no distress, appears stated age  Neck:  no JVD  Lungs:  Minimal basilar rales  Heart:  regular rate and rhythm, S1, S2 normal, no gallop  Abdomen:  abdomen is soft without significant tenderness  Extremities: 3/1+ bilateral lower extremity edema, scrotal swelling noted  Skin: Warm and dry.  no hyperpigmentation, vitiligo, or suspicious lesions  Neuro: alert, oriented x3,      Data Review:     Recent Labs     06/15/22  0246 06/14/22  1515   WBC 5.4 5.3   HGB 13.9 13.8   HCT 40.5 40.7   * 119*     Recent Labs 06/15/22  0246 06/14/22  2105 06/14/22 2010 06/14/22  1515     --   --  140   K 3.1*  --   --  3.6     --   --  103   CO2 33*  --   --  32   GLU 83  --   --  85   BUN 11  --   --  9   CREA 0.85  --   --  0.73   CA 8.7  --   --  8.5   MG 2.2  --   --   --    ALB  --   --  2.9*  --    ALT  --   --  18  --    INR 2.1* 2.1*  --   --        Results for orders placed or performed during the hospital encounter of 06/14/22   EKG, 12 LEAD, INITIAL   Result Value Ref Range    Ventricular Rate 62 BPM    Atrial Rate 62 BPM    P-R Interval 218 ms    QRS Duration 106 ms    Q-T Interval 458 ms    QTC Calculation (Bezet) 464 ms    Calculated P Axis 102 degrees    Calculated R Axis 12 degrees    Calculated T Axis 18 degrees    Diagnosis       Sinus rhythm with 1st degree AV block  Otherwise normal ECG  No previous ECGs available         All Cardiac Markers in the last 24 hours:  No results found for: CPK, CK, CKMMB, CKMB, RCK3, CKMBT, CKNDX, CKND1, MADELIN, TROPT, TROIQ, SAUD, TROPT, TNIPOC, BNP, BNPP    Last Lipid:  No results found for: CHOL, CHOLX, CHLST, CHOLV, HDL, HDLP, LDL, LDLC, DLDLP, TGLX, TRIGL, TRIGP, CHHD, CHHDX    Cardiographics:     EKG Results     Procedure 720 Value Units Date/Time    EKG, 12 LEAD, INITIAL [[id number]] Collected: 06/15/22 0910    Order Status: Completed Updated: 06/15/22 0911     Ventricular Rate 62 BPM      Atrial Rate 62 BPM      P-R Interval 218 ms      QRS Duration 106 ms      Q-T Interval 458 ms      QTC Calculation (Bezet) 464 ms      Calculated P Axis 102 degrees      Calculated R Axis 12 degrees      Calculated T Axis 18 degrees      Diagnosis --     Sinus rhythm with 1st degree AV block  Otherwise normal ECG  No previous ECGs available      EKG, 12 LEAD, INITIAL [[id number]]     Order Status: Canceled                   XR Results (most recent):  Results from Hospital Encounter encounter on 06/14/22    XR CHEST PORT    Narrative  EXAM: XR CHEST PORT    Indications: CHF    Comparison: No prior    Findings:    Lines/Tubes/Devices:  Battery pack for electrode extending craniad present over  the right chest.  LUNGS: No consolidation or effusion. Slight prominence of interstitium which  could be from hypertension. MEDIASTINUM: Cardiomegaly  BONES/SOFT TISSUES: No acute findings    Impression  :    1. Nothing clearly acute.  Slight prominence of the vasculature without overt  edema to substantiate acute heart failure        Signed By: Biju Abebe MD     [place] 15, 2022

## 2022-06-15 NOTE — H&P
Hospitalist Admission History and Physical    NAME:  Brittnee Pabon   :   1950   MRN:   [id number]     PCP:  Vishal Hammer MD  Date/Time:  2022 8:47 PM    Subjective:   CHIEF COMPLAINT: Scrotal swelling    HISTORY OF PRESENT ILLNESS:     Wendy Becker is a 67 y.o.  male with PMH of diastolic CHF, hypertension, severe obesity, essential tremor, paroxysmal A. fib, history of alcohol abuse, recurrent major depression, who presents with scrotal edema present for the last 7 days. Patient states he has been compliant with his Lasix at home however despite this scrotal swelling progressed and got \"real bad\" starting on Friday and progressively worse over the weekend. Patient states he has been living in a group home, normally ambulatory however over the past 1 week he has been having difficulty ambulating due to severity of scrotal swelling. He states he has been able to void however he has needed to sit down to urinate and overall difficult to position himself. Patient reports he chronically gets dyspnea on exertion and he has not noticed this to worsen, denies shortness of breath at rest he denies chest pain at rest or with ambulation, denies palpitations. He he is unsure if his dry weight but states he has been trying to lose weight over the last several months. Patient denies blood in urine or stools, states he is compliant with his medications including his Coumadin. Patient Active Problem List   Diagnosis Code    Scrotal edema N50.89    Fluid overload E87.70    Bilateral lower extremity pain M79.604, M79.605    Chronic depression F32. A    Chronic diastolic heart failure (HCC) I50.32    Essential hypertension I10    Class 2 severe obesity due to excess calories with serious comorbidity in adult (HCC) E66.01     Essential tremor     Obesity (BMI 30-39. 9) E66.9    H/O alcohol abuse F10.11    Paroxysmal atrial fibrillation (HCC) I48.0    Recurrent major depressive disorder, in partial remission ([facility] F33.41    Peripheral vascular disease, unspecified ([place] [id number].) I73.9    Sleep apnea G47.30       Past Medical History:   Diagnosis Date    A-fib ([facility]     Bronchitis     Cardiomyopathy ([facility]     CHF (congestive heart failure) (HCC)     DVT (deep venous thrombosis) (HCC)     Essential tremor     Exposure to Agent Flexiroam GERD (gastroesophageal reflux disease)     History of pulmonary embolus (PE)     HTN (hypertension)     Major depressive disorder     Obesity     Parkinson's disease ([facility]     PVD (peripheral vascular disease) ([facility]     Vitamin D deficiency        Past Surgical History:   Procedure Laterality Date    NEUROLOGICAL PROCEDURE UNLISTED      Neurostimulator to brain. Social History     Tobacco Use    Smoking status: Former Smoker     Quit date:      Years since quittin.4    Smokeless tobacco: Not on file   Substance Use Topics    Alcohol use: Not Currently        No family history on file. Allergies   Allergen Reactions    Penicillins Unknown (comments)     Patient was told he was allergic since he was an infant        Prior to Admission Medications   Prescriptions Last Dose Informant Patient Reported? Taking? FLUoxetine (PROzac) 20 mg capsule 2022 at Unknown time  Yes Yes   Sig: Take 20 mg by mouth daily. carvediloL (COREG) 6.25 mg tablet 2022 at Unknown time  Yes Yes   Sig: Take 6.25 mg by mouth two (2) times a day. compr.stocking,knee,long,large (COMP STOCKING,KNEE,LONG,LARGE) misc Not Taking at Unknown time  No No   Si Package by Does Not Apply route daily. Patient not taking: Reported on 2022   furosemide (LASIX) 40 mg tablet 2022 at Unknown time  Yes Yes   Sig: Take two tabs twice a day   omeprazole (PRILOSEC) 20 mg capsule 2022 at Unknown time  Yes Yes   Sig: Take 20 mg by mouth daily. potassium chloride SR (K-TAB) 20 mEq tablet 2022 at Unknown time  Yes Yes   Sig: Take 20 mEq by mouth daily. warfarin (COUMADIN) 5 mg tablet 2022 at Unknown time  Yes Yes   Sig: Take 1 tablet daily as directed by [facility]. Facility-Administered Medications: None     REVIEW OF SYSTEMS:     [] Unable to obtain  ROS due to  []mental status change  []sedated   []intubated   [x]Total of 12 systems reviewed as follows:    GENERAL: no fever or chills   HEENT: no sinus congestion, hearing / vision changes  NECK: No pain or stiffness. PULMONARY:  Reports chronic dyspnea on exertion without any worsening, no shortness of breath at rest and no cough  Cardiovascular: denies palpitations or chest pain; 's lower extremity edema  GASTROINTESTINAL: Denies abdominal pain, constipation, diarrhea, nausea, vomiting or melena / bloody stools  GENITOURINARY: Reports needing to sit on toilet to void no other voiding complaints at this time including no pain with urination. Reports presence of pain due to scrotal swelling  MUSCULOSKELETAL: no back / joint or muscle pain, no recent trauma. DERMATOLOGIC: denies pruritis, rashes or open areas   ENDOCRINE: No polyuria, polydipsia, no heat or cold intolerance. HEMATOLOGICAL: No easy bruising or bleeding. No anemia, no evidence of active bleeding  NEUROLOGIC:  no focal weakness,  no speech changes     Objective:   VITALS:    Visit Vitals  BP (!) 154/65   Pulse 65   Temp 98.5 °F (36.9 °C)   Resp 20   Ht 6' (1.829 m)   Wt 149.7 kg (330 lb)   SpO2 98%   BMI 44.76 kg/m²     Temp (24hrs), Av.6 °F (37 °C), Min:98.5 °F (36.9 °C), Max:98.7 °F (37.1 °C)    PHYSICAL EXAM:   General:          Alert, in NAD  HEENT:          Sclera anicteric. Conjunctiva pink. Mucous membranes                          Moist, no ear or nasal discharge  Neck:               Supple. Trachea midline. No accessory muscle use  CV:                  Regular rate and rhythm. S1S2+  Lungs:             Clear to auscultation bilaterally. No Wheezing or Rhonchi. No rales. Abdomen:        Soft, non-tender.  Not distended. Bowel sounds normal.   Extremities:     +scrotal swelling and tenderness, No cyanosis. 1+ BLE edema. Neurologic:      Alert and oriented X 4. Follows commands, responds appropriately. No focal neurological deficit was noted  Skin:                Warm and dry. No rashes. LAB DATA REVIEWED:    No components found for: [place]  Recent Labs     06/14/22  1515      K 3.6      CO2 32   BUN 9   CREA 0.73   GLU 85   CA 8.5   WBC 5.3   HGB 13.8   HCT 40.7   *     IMAGING RESULTS:     [x]  I have personally reviewed the actual   [x]CXR  []CT scan    Assessment/Plan:      Active Problems:    Scrotal edema (6/14/2022)      Fluid overload (6/14/2022)      Bilateral lower extremity pain (6/14/2022)       ___________________________________________________  PLAN:    1. Scrotal swelling -carotid ultrasound with severe edema, no evidence of abscess /torsion, continue IV Lasix twice daily. Strict I's and O's, elevation as able  2. Acute on chronic diastolic CHF -strict I's and O's, no evidence of pulmonary involvement, recheck echo, IV Lasix, cardiology consulted; monitor Electrolytes and renal function closely  3. Paroxysmal A. Fib -check monitoring, check EKG, continue Coreg, awaiting INR if less than 1.5 will initiate heparin drip. Hold Coumadin for now in case of any cardiac procedures  4. Essential tremor - no formal diagnosis of Parkinson's per patient  5.  History of alcohol abuse - denies use x3 years, LFTs improved     Consults called / follow up -neurology    Prophylaxis:  []Lovenox  []Coumadin  []Hep SQ  []SCDs  []H2B/PPI    Discussed Code Status:    [x]Full Code      []DNR     ___________________________________________________  Admitting Provider: David Shafer NP

## 2022-06-15 NOTE — ROUTINE PROCESS
Heel prevention boots placed on patient     []? Patient turned q2h during shift     []? Lift team ordered     []? Patient on [place] bed/Specialty bed     [x]? Each Wound is documented during shift (Stage, Color, drainage, odor, measurements, and dressings)     [x]?   Dual skin checks done at bedside during shift report with MARV Wyatt

## 2022-06-16 LAB
ANION GAP SERPL CALC-SCNC: 1 MMOL/L (ref 3–18)
BUN SERPL-MCNC: 13 MG/DL (ref 7–18)
BUN/CREAT SERPL: 16 (ref 12–20)
CALCIUM SERPL-MCNC: 9.2 MG/DL (ref 8.5–10.1)
CHLORIDE SERPL-SCNC: 104 MMOL/L (ref 100–111)
CO2 SERPL-SCNC: 33 MMOL/L (ref 21–32)
CREAT SERPL-MCNC: 0.83 MG/DL (ref 0.6–1.3)
GLUCOSE SERPL-MCNC: 119 MG/DL (ref 74–99)
INR PPP: 1.8 (ref 0.8–1.2)
POTASSIUM SERPL-SCNC: 3.5 MMOL/L (ref 3.5–5.5)
PROTHROMBIN TIME: 21 SEC (ref 11.5–15.2)
SODIUM SERPL-SCNC: 138 MMOL/L (ref 136–145)

## 2022-06-16 PROCEDURE — 85610 PROTHROMBIN TIME: CPT

## 2022-06-16 PROCEDURE — 2709999900 HC NON-CHARGEABLE SUPPLY

## 2022-06-16 PROCEDURE — APPSS30 APP SPLIT SHARED TIME 16-30 MINUTES: Performed by: PHYSICIAN ASSISTANT

## 2022-06-16 PROCEDURE — 65270000046 HC RM TELEMETRY

## 2022-06-16 PROCEDURE — 74011000250 HC RX REV CODE- 250: Performed by: NURSE PRACTITIONER

## 2022-06-16 PROCEDURE — 99232 SBSQ HOSP IP/OBS MODERATE 35: CPT | Performed by: INTERNAL MEDICINE

## 2022-06-16 PROCEDURE — 74011250637 HC RX REV CODE- 250/637: Performed by: PHYSICIAN ASSISTANT

## 2022-06-16 PROCEDURE — 74011250637 HC RX REV CODE- 250/637: Performed by: NURSE PRACTITIONER

## 2022-06-16 PROCEDURE — 99232 SBSQ HOSP IP/OBS MODERATE 35: CPT | Performed by: PHYSICIAN ASSISTANT

## 2022-06-16 PROCEDURE — 80048 BASIC METABOLIC PNL TOTAL CA: CPT

## 2022-06-16 PROCEDURE — 36415 COLL VENOUS BLD VENIPUNCTURE: CPT

## 2022-06-16 PROCEDURE — 77030037878 HC DRSG MEPILEX >48IN BORD MOLN -B

## 2022-06-16 PROCEDURE — 74011250636 HC RX REV CODE- 250/636: Performed by: NURSE PRACTITIONER

## 2022-06-16 PROCEDURE — 77030040392 HC DRSG OPTIFOAM MDII -A

## 2022-06-16 PROCEDURE — APPNB30 APP NON BILLABLE TIME 0-30 MINS: Performed by: PHYSICIAN ASSISTANT

## 2022-06-16 RX ORDER — POTASSIUM CHLORIDE 20 MEQ/1
20 TABLET, EXTENDED RELEASE ORAL ONCE
Status: COMPLETED | OUTPATIENT
Start: 2022-06-16 | End: 2022-06-16

## 2022-06-16 RX ORDER — WARFARIN SODIUM 5 MG/1
5 TABLET ORAL ONCE
Status: COMPLETED | OUTPATIENT
Start: 2022-06-16 | End: 2022-06-16

## 2022-06-16 RX ADMIN — FUROSEMIDE 40 MG: 10 INJECTION, SOLUTION INTRAMUSCULAR; INTRAVENOUS at 10:08

## 2022-06-16 RX ADMIN — CARVEDILOL 6.25 MG: 6.25 TABLET, FILM COATED ORAL at 10:09

## 2022-06-16 RX ADMIN — FLUOXETINE 20 MG: 20 CAPSULE ORAL at 10:09

## 2022-06-16 RX ADMIN — SODIUM CHLORIDE, PRESERVATIVE FREE 10 ML: 5 INJECTION INTRAVENOUS at 21:33

## 2022-06-16 RX ADMIN — SODIUM CHLORIDE, PRESERVATIVE FREE 10 ML: 5 INJECTION INTRAVENOUS at 10:10

## 2022-06-16 RX ADMIN — SODIUM CHLORIDE, PRESERVATIVE FREE 10 ML: 5 INJECTION INTRAVENOUS at 18:06

## 2022-06-16 RX ADMIN — WARFARIN SODIUM 5 MG: 5 TABLET ORAL at 18:06

## 2022-06-16 RX ADMIN — FUROSEMIDE 40 MG: 10 INJECTION, SOLUTION INTRAMUSCULAR; INTRAVENOUS at 18:06

## 2022-06-16 RX ADMIN — POTASSIUM CHLORIDE 20 MEQ: 1500 TABLET, EXTENDED RELEASE ORAL at 18:06

## 2022-06-16 RX ADMIN — CARVEDILOL 6.25 MG: 6.25 TABLET, FILM COATED ORAL at 18:06

## 2022-06-16 RX ADMIN — PANTOPRAZOLE SODIUM 40 MG: 40 TABLET, DELAYED RELEASE ORAL at 10:09

## 2022-06-16 NOTE — CONSULTS
Comprehensive Nutrition Assessment    Type and Reason for Visit: Consult,Initial    Nutrition Recommendations/Plan:   1. Continue current nutrition interventions. Malnutrition Assessment:  Malnutrition Status:  No malnutrition (06/16/22 1719)      Nutrition History and Allergies:   Past medical history of diastolic CHF, hypertension, severe obesity, essential tremor, paroxysmal A. fib, history of alcohol abuse, recurrent major depression, who presents with scrotal edema present for the last 7 days. Patient eating well PTA with usual weight of 250-270 lb for most of his adult life and for the past 6-8 months PTA has gained weight up to 340 lb related to fluid accumulation. NKFA     Nutrition Assessment:    Patient tolerating diet with good appetite and consuming most of meals, requires assistance with meal intake due to tremors and noted missing teeth but able to tolerate current diet without issues. Nutrition Related Findings:    Last BM 6/15. Pertinent Medications: furosemide, 20 mEq K, coumadin. Toe wound, hard red area on RLQ abdomen. Wound Type: Multiple    Current Nutrition Intake & Therapies:  Average Meal Intake: %  Average Supplement Intake: None ordered  ADULT DIET Regular; No Salt Added (3-4 gm); 1500 ml    Anthropometric Measures:  Height: 6' (182.9 cm)  Ideal Body Weight (IBW): 178 lbs (81 kg)  Admission Body Weight: 330 lb 0.5 oz  Current Body Wt:  149.7 kg (330 lb 0.5 oz), 185.4 % IBW.     Current BMI (kg/m2): 44.8  Usual Body Weight: 113.4 kg (250 lb)  % Weight Change (Calculated): 32  Weight Adjustment: No adjustment                 BMI Category: Obese class 3 (BMI 40.0 or greater)    Estimated Daily Nutrient Needs:  Energy Requirements Based On: Formula  Weight Used for Energy Requirements: Usual  Energy (kcal/day): 3118-2495  Weight Used for Protein Requirements: Usual  Protein (g/day):   Method Used for Fluid Requirements: 1 ml/kcal  Fluid (ml/day): 9348-8961    Nutrition Diagnosis:   No nutrition diagnosis at this time     Nutrition Interventions:   Food and/or Nutrient Delivery: Continue current diet  Nutrition Education/Counseling: No recommendations at this time,Education not indicated  Coordination of Nutrition Care: Continue to monitor while inpatient  Plan of Care discussed with: pt    Goals:  Goals: Meet at least 75% of estimated needs,by next RD assessment    Nutrition Monitoring and Evaluation:   Behavioral-Environmental Outcomes: None identified  Food/Nutrient Intake Outcomes: Food and nutrient intake  Physical Signs/Symptoms Outcomes: Meal time behavior    Discharge Planning:    No discharge needs at this time    Cindy Mora, [address], [address]: [phone]

## 2022-06-16 NOTE — PROGRESS NOTES
[facility] Hospitalist Group  Progress Note    Patient: Victorina Darden Age: 67 y.o. : 1950 MR#: [id number] SSN: [id number]  Date: 2022         Assessment/Plan:     Hospital Problems  Never Reviewed          Codes Class Noted POA    Hypokalemia ICD-10-CM: E87.6  ICD-9-CM: 276.8  6/15/2022 No        Scrotal edema ICD-10-CM: N50.89  ICD-9-CM: 608.86  2022 Unknown        * (Principal) Acute on chronic diastolic heart failure (HCC) ICD-10-CM: I50.33  ICD-9-CM: 428.33  2022 Yes        Bilateral lower extremity pain ICD-10-CM: M79.604, M79.605  ICD-9-CM: 729.5  2022 Unknown        Paroxysmal atrial fibrillation (HCC) ICD-10-CM: I48.0  ICD-9-CM: 427.31  2020 Yes            Acute on chronic HFpEF  - continue with IV diuresis  - monitor BMP - renal function is stable  - strict I&Os, daily weight  - echo noted  - appreciate cardiology assistance    Scrotal edema: 2/2 HF. Pain and swelling improving with diuresis. No s/sx of infection    PAF  -.cont coreg, warfarin  - daily INRs    Hypokalemia  - replace as needed and monitor         DVT Prophylaxis:  []Lovenox  []Hep SQ  []SCDs  [x]Coumadin   []On Heparin gtt []PO anticoagulant    Anticipated discharge: 1-2 days; PT recs SNF    Subjective:     Pt s/e @ bedside. No major events overnight. Pt stated pain and swelling have continued to improve. Denies cp, sob, abd pain, nvd.     Objective:   VS:   Visit Vitals  /65   Pulse 69   Temp 97.5 °F (36.4 °C)   Resp 20   Ht 6' (1.829 m)   Wt 149.7 kg (330 lb)   SpO2 93%   BMI 44.76 kg/m²      Tmax/24hrs: Temp (24hrs), Av.8 °F (36.6 °C), Min:97.5 °F (36.4 °C), Max:98.1 °F (36.7 °C)      Intake/Output Summary (Last 24 hours) at 2022 1639  Last data filed at 2022 1023  Gross per 24 hour   Intake 120 ml   Output 1000 ml   Net -880 ml       General Appearance: NAD, conversant  HENT: normocephalic/atraumatic, moist mucus membranes  Neck: No JVD, supple  Lungs: CTA with normal respiratory effort  CV: RRR, no m/r/g  Abdomen: soft, non-tender, normal bowel sounds  Extremities: no cyanosis, 2+ pitting edema BLE  Neuro: No focal deficits, motor/sensory intact  Skin: Normal color, intact  Psych: appropriate affect    Current Facility-Administered Medications   Medication Dose Route Frequency    warfarin (COUMADIN) tablet 5 mg  5 mg Oral ONCE    potassium chloride (K-DUR, KLOR-CON M20) SR tablet 20 mEq  20 mEq Oral ONCE    WARFARIN INFORMATION NOTE (COUMADIN)   Other Q24H    sodium chloride (NS) flush 5-40 mL  5-40 mL IntraVENous Q8H    sodium chloride (NS) flush 5-40 mL  5-40 mL IntraVENous PRN    acetaminophen (TYLENOL) tablet 650 mg  650 mg Oral Q6H PRN    Or    acetaminophen (TYLENOL) suppository 650 mg  650 mg Rectal Q6H PRN    polyethylene glycol (MIRALAX) packet 17 g  17 g Oral DAILY PRN    ondansetron (ZOFRAN ODT) tablet 4 mg  4 mg Oral Q8H PRN    Or    ondansetron (ZOFRAN) injection 4 mg  4 mg IntraVENous Q6H PRN    furosemide (LASIX) injection 40 mg  40 mg IntraVENous BID    carvediloL (COREG) tablet 6.25 mg  6.25 mg Oral BID    FLUoxetine (PROzac) capsule 20 mg  20 mg Oral DAILY    pantoprazole (PROTONIX) tablet 40 mg  40 mg Oral ACB        Labs:    Recent Results (from the past 24 hour(s))   PROTHROMBIN TIME + INR    Collection Time: 06/16/22  2:49 AM   Result Value Ref Range    Prothrombin time 21.0 (H) 11.5 - 15.2 sec    INR 1.8 (H) 0.8 - 1.2     METABOLIC PANEL, BASIC    Collection Time: 06/16/22  9:45 AM   Result Value Ref Range    Sodium 138 136 - 145 mmol/L    Potassium 3.5 3.5 - 5.5 mmol/L    Chloride 104 100 - 111 mmol/L    CO2 33 (H) 21 - 32 mmol/L    Anion gap 1 (L) 3.0 - 18 mmol/L    Glucose 119 (H) 74 - 99 mg/dL    BUN 13 7.0 - 18 MG/DL    Creatinine 0.83 0.6 - 1.3 MG/DL    BUN/Creatinine ratio 16 12 - 20      GFR est AA >60 >60 ml/min/1.73m2    GFR est non-AA >60 >60 ml/min/1.73m2    Calcium 9.2 8.5 - 10.1 MG/DL       Imaging:  No results found.      Signed By: KARINA Goncalves DR.[facility]  Hospitalist Division  Office:  [phone]  Pager: [phone]         June 16, 2022 7:46 PM

## 2022-06-16 NOTE — PROGRESS NOTES
Warfarin Dosing - Pharmacy Consult Note  Consulting Provider: EULALIA Hester  Indication: History of VTE/PE and Atrial Fibrillation  Warfarin Dose prior to admission: 30 mg/week (5 mg po daily except Monday and Friday, he takes 2.5 mg)  Anticoag visits from [facility] in [facility]   Concurrent anticoagulants/antiplatelets: none  Significant Drug Interactions: No obvious interactions  Recent Labs     06/16/22  0249 06/15/22  0246 06/14/22  2105 06/14/22  1515   INR 1.8* 2.1* 2.1*  --    HGB  --  13.9  --  13.8   PLT  --  113*  --  119*     [unfilled]  Date      INR       Dose  6/16        1.8        5 mg  6/15         2.1       5 mg    Assessment/Plan  Goal INR: 2 - 3  5 mg dose Warfarin for Thursday, 6/16 per home schedule    Active problem list reviewed. INR orders are placed. Chart reviewed for pertinent labs, drug/diet interactions, and past doses. Documentation of patient's clinical condition was reviewed. Pharmacy Dosing:  Pharmacy will continue to follow.

## 2022-06-16 NOTE — PROGRESS NOTES
Wound Prevention Checklist    Patient: Jules Hernandez (34 y.o. male)  Date: 6/16/2022  Diagnosis: Fluid overload [E87.70]  Scrotal edema [N50.89]  Bilateral lower extremity pain [M79.604, M79.605] Acute on chronic diastolic heart failure (HCC)    Precautions: Skin,Fall       []  Heel prevention boots placed on patient    []  Patient turned q2h during shift    [x]  Lift team ordered    [x]  Patient on [place] bed/Specialty bed    [x]  Each Wound is documented during shift (Stage, Color, drainage, odor, measurements, and dressings)    [x]  Dual skin checks done at bedside during shift report with Madison Carpenter

## 2022-06-16 NOTE — PROGRESS NOTES
Warfarin Dosing - Pharmacy Consult Note  Consulting Provider: EULALIA Michel  Indication: History of VTE/PE and Atrial Fibrillation  Warfarin Dose prior to admission: 30mg/week (5mg daily except Mon and Fri takes 2.5mg) See anticoag visit from [facility]   Concurrent anticoagulants/antiplatelets: none  Significant Drug Interactions: No obvious interactions  Recent Labs     06/15/22  0246 06/14/22  2105 06/14/22  1515   INR 2.1* 2.1*  --    HGB 13.9  --  13.8   *  --  119*         Assessment/Plan  Goal INR: 2 - 3  5mg warfarin ordered per home regimen. Active problem list reviewed. INR orders are placed. Chart reviewed for pertinent labs, drug/diet interactions, and past doses. Documentation of patient's clinical condition was reviewed. Pharmacy Dosing:  Pharmacy will continue to follow.     Dave Bhatti, YannickD, BCPS

## 2022-06-16 NOTE — PROGRESS NOTES
Cardiology Progress Note    Admit Date: 6/14/2022  Attending Cardiologist: Dr. Maricel Hernandez   Patient seen and examined independently. Will plan to continue diuretics for now. He has no specific complaints today. May benefit from physical therapy. Agree with assessment and plan as noted below. Shakila Lobo MD  Assessment:     -Admitted for scrotal edema and concern with fluid overload. Albumin is 2.4, hypoalbuminemia contributing factor as well  -History of possible HFpEF. Last echo in 2021 with normal EF and normal diastolic function at [facility]  · NST 1/2019: negative for ischemia/infarct. EF 78%. · Mild to moderate MR based on echo in 2021  - Paroxysmal atrial fibrillation. On chronic warfarin.  - Hypertension  - Tremor (debillitating). - Sleep apnea. - H/o ETOH abuse (stopped 8/2019). - Hx of cocaine, marijuana in the 90's. -Morbid obesity  -Current major depression        Primary cardiologist : Norman Mota  Plan:     -ECHO this admission with normal LVEF. -Replace K, recommend maintaining at 4.0   -Continued on IV diuretics are renal indices allow. Monitor strict I/os   -Continue Coreg.   -Continue coumadin for thromboembolic prophylaxis. Dosing per pharmacy.   -Increase activity   -Encourage lifestyle modifications, including weight loss. Subjective:     No new complaints.      Objective:      Patient Vitals for the past 8 hrs:   Temp Pulse Resp BP SpO2   06/16/22 0436 97.9 °F (36.6 °C) 68 18 134/67 93 %   06/16/22 0047 97.7 °F (36.5 °C) 65 18 (!) 147/66 93 %         Patient Vitals for the past 96 hrs:   Weight   06/15/22 0948 149.7 kg (330 lb)   06/14/22 1344 149.7 kg (330 lb)       TELE: normal sinus rhythm               Current Facility-Administered Medications   Medication Dose Route Frequency Last Admin    WARFARIN INFORMATION NOTE (COUMADIN)   Other Q24H 1 Each at 06/15/22 2100    sodium chloride (NS) flush 5-40 mL  5-40 mL IntraVENous Q8H 10 mL at 06/15/22 0905    sodium chloride (NS) flush 5-40 mL  5-40 mL IntraVENous PRN      acetaminophen (TYLENOL) tablet 650 mg  650 mg Oral Q6H  mg at 06/15/22 0935    Or    acetaminophen (TYLENOL) suppository 650 mg  650 mg Rectal Q6H PRN      polyethylene glycol (MIRALAX) packet 17 g  17 g Oral DAILY PRN      ondansetron (ZOFRAN ODT) tablet 4 mg  4 mg Oral Q8H PRN      Or    ondansetron (ZOFRAN) injection 4 mg  4 mg IntraVENous Q6H PRN      furosemide (LASIX) injection 40 mg  40 mg IntraVENous BID 40 mg at 06/15/22 1807    carvediloL (COREG) tablet 6.25 mg  6.25 mg Oral BID 6.25 mg at 06/15/22 1807    FLUoxetine (PROzac) capsule 20 mg  20 mg Oral DAILY 20 mg at 06/15/22 0935    pantoprazole (PROTONIX) tablet 40 mg  40 mg Oral ACB 40 mg at 06/15/22 0935         Intake/Output Summary (Last 24 hours) at 6/16/2022 0749  Last data filed at 6/16/2022 0445  Gross per 24 hour   Intake 360 ml   Output 1700 ml   Net -1340 ml       Physical Exam:  General:  alert, cooperative, no distress, appears stated age, morbidly obese  Neck:  no JVD  Lungs:  Few bibasilar rales   Heart:  RRR  Abdomen:  Soft, non TTP   Extremities:  +B/L LE edema     Visit Vitals  /67 (BP 1 Location: Left upper arm, BP Patient Position: Sitting)   Pulse 68   Temp 97.9 °F (36.6 °C)   Resp 18   Ht 6' (1.829 m)   Wt 149.7 kg (330 lb)   SpO2 93%   BMI 44.76 kg/m²       Data Review:     Labs: Results:       Chemistry Recent Labs     06/15/22  0246 06/14/22 2010 06/14/22  1515   GLU 83  --  85     --  140   K 3.1*  --  3.6     --  103   CO2 33*  --  32   BUN 11  --  9   CREA 0.85  --  0.73   CA 8.7  --  8.5   MG 2.2  --   --    AGAP 4  --  5   BUCR 13  --  12   AP  --  122*  --    TP  --  7.1  --    ALB  --  2.9*  --    GLOB  --  4.2*  --    AGRAT  --  0.7*  --       CBC w/Diff Recent Labs     06/15/22  0246 06/14/22  1515   WBC 5.4 5.3   RBC 4.41 4.38   HGB 13.9 13.8   HCT 40.5 40.7   * 119*   GRANS  --  47   LYMPH  --  27   EOS  --  4 Cardiac Enzymes No results found for: CPK, CK, CKMMB, CKMB, RCK3, CKMBT, CKNDX, CKND1, MADELIN, TROPT, TROIQ, SAUD, TROPT, TNIPOC, BNP, BNPP   Coagulation Recent Labs     06/16/22  0249 06/15/22  0246   PTP 21.0* 23.6*   INR 1.8* 2.1*       Lipid Panel No results found for: CHOL, CHOLPOCT, CHOLX, CHLST, CHOLV, 758977, HDL, HDLP, LDL, LDLC, DLDLP, 428405, VLDLC, VLDL, TGLX, TRIGL, TRIGP, TGLPOCT, CHHD, CHHDX   BNP No results found for: BNP, BNPP, XBNPT   Liver Enzymes Recent Labs     06/14/22 2010   TP 7.1   ALB 2.9*   *      Thyroid Studies No results found for: T4, T3U, TSH, TSHEXT       Signed By: Kay Lane PA-C     June 16, 2022

## 2022-06-16 NOTE — PROGRESS NOTES
Mills removed per MD order. Patient tolerated fairly, however, complained of slight pain upon removal. Male external catheter placed on patient. Patient urinated approximately 100 mL of blood-tinged urine shortly after removal of mills. Hospitalist notified, no new orders at this time.

## 2022-06-16 NOTE — PROGRESS NOTES
Problem: Falls - Risk of  Goal: *Absence of Falls  Description: Document Shawn Russell Fall Risk and appropriate interventions in the flowsheet.   Outcome: Progressing Towards Goal  Note: Fall Risk Interventions:  Mobility Interventions: Bed/chair exit alarm,Utilize walker, cane, or other assistive device,Patient to call before getting OOB         Medication Interventions: Bed/chair exit alarm    Elimination Interventions: Call light in reach,Patient to call for help with toileting needs    History of Falls Interventions: Bed/chair exit alarm,Door open when patient unattended,Room close to nurse's station         Problem: Patient Education: Go to Patient Education Activity  Goal: Patient/Family Education  Outcome: Progressing Towards Goal     Problem: Pain  Goal: *Control of Pain  Outcome: Progressing Towards Goal  Goal: *PALLIATIVE CARE:  Alleviation of Pain  Outcome: Progressing Towards Goal     Problem: Patient Education: Go to Patient Education Activity  Goal: Patient/Family Education  Outcome: Progressing Towards Goal     Problem: Patient Education: Go to Patient Education Activity  Goal: Patient/Family Education  Outcome: Progressing Towards Goal     Problem: Patient Education: Go to Patient Education Activity  Goal: Patient/Family Education  Outcome: Progressing Towards Goal

## 2022-06-16 NOTE — PROGRESS NOTES
[x]??  Patient turned q2h during shift ( Prompted pt to turn)      []? ?  Lift team ordered     []? ?  Patient on [facility] bed/Specialty bed     [x]? ?  Each Wound is documented during shift (Stage, Color, drainage, odor, measurements, and dressings)     [x]? ?  Dual skin checks done at bedside during shift report with MARV Quiroz

## 2022-06-16 NOTE — PROGRESS NOTES
Problem: Falls - Risk of  Goal: *Absence of Falls  Description: Document Renu Scott Fall Risk and appropriate interventions in the flowsheet.   Outcome: Progressing Towards Goal  Note: Fall Risk Interventions:  Mobility Interventions: Bed/chair exit alarm,Patient to call before getting OOB,Utilize walker, cane, or other assistive device         Medication Interventions: Bed/chair exit alarm,Teach patient to arise slowly,Patient to call before getting OOB    Elimination Interventions: Call light in reach,Bed/chair exit alarm    History of Falls Interventions: Bed/chair exit alarm         Problem: Patient Education: Go to Patient Education Activity  Goal: Patient/Family Education  Outcome: Progressing Towards Goal     Problem: Pain  Goal: *Control of Pain  Outcome: Progressing Towards Goal     Problem: Patient Education: Go to Patient Education Activity  Goal: Patient/Family Education  Outcome: Progressing Towards Goal     Problem: Patient Education: Go to Patient Education Activity  Goal: Patient/Family Education  Outcome: Progressing Towards Goal     Problem: Patient Education: Go to Patient Education Activity  Goal: Patient/Family Education  Outcome: Progressing Towards Goal

## 2022-06-17 LAB
ANION GAP SERPL CALC-SCNC: 4 MMOL/L (ref 3–18)
BUN SERPL-MCNC: 13 MG/DL (ref 7–18)
BUN/CREAT SERPL: 17 (ref 12–20)
CALCIUM SERPL-MCNC: 8.8 MG/DL (ref 8.5–10.1)
CHLORIDE SERPL-SCNC: 104 MMOL/L (ref 100–111)
CO2 SERPL-SCNC: 33 MMOL/L (ref 21–32)
CREAT SERPL-MCNC: 0.76 MG/DL (ref 0.6–1.3)
GLUCOSE SERPL-MCNC: 81 MG/DL (ref 74–99)
INR PPP: 1.7 (ref 0.8–1.2)
POTASSIUM SERPL-SCNC: 3.5 MMOL/L (ref 3.5–5.5)
PROTHROMBIN TIME: 20.4 SEC (ref 11.5–15.2)
SODIUM SERPL-SCNC: 141 MMOL/L (ref 136–145)

## 2022-06-17 PROCEDURE — 80048 BASIC METABOLIC PNL TOTAL CA: CPT

## 2022-06-17 PROCEDURE — 74011250636 HC RX REV CODE- 250/636: Performed by: NURSE PRACTITIONER

## 2022-06-17 PROCEDURE — 65270000046 HC RM TELEMETRY

## 2022-06-17 PROCEDURE — 99232 SBSQ HOSP IP/OBS MODERATE 35: CPT | Performed by: INTERNAL MEDICINE

## 2022-06-17 PROCEDURE — 2709999900 HC NON-CHARGEABLE SUPPLY

## 2022-06-17 PROCEDURE — 85610 PROTHROMBIN TIME: CPT

## 2022-06-17 PROCEDURE — 36415 COLL VENOUS BLD VENIPUNCTURE: CPT

## 2022-06-17 PROCEDURE — 74011250637 HC RX REV CODE- 250/637: Performed by: NURSE PRACTITIONER

## 2022-06-17 PROCEDURE — 74011000250 HC RX REV CODE- 250: Performed by: NURSE PRACTITIONER

## 2022-06-17 PROCEDURE — 74011250637 HC RX REV CODE- 250/637: Performed by: PHYSICIAN ASSISTANT

## 2022-06-17 PROCEDURE — 99232 SBSQ HOSP IP/OBS MODERATE 35: CPT | Performed by: HOSPITALIST

## 2022-06-17 RX ORDER — WARFARIN SODIUM 5 MG/1
5 TABLET ORAL ONCE
Status: COMPLETED | OUTPATIENT
Start: 2022-06-17 | End: 2022-06-17

## 2022-06-17 RX ADMIN — SODIUM CHLORIDE, PRESERVATIVE FREE 10 ML: 5 INJECTION INTRAVENOUS at 21:58

## 2022-06-17 RX ADMIN — FLUOXETINE 20 MG: 20 CAPSULE ORAL at 09:36

## 2022-06-17 RX ADMIN — FUROSEMIDE 40 MG: 10 INJECTION, SOLUTION INTRAMUSCULAR; INTRAVENOUS at 18:57

## 2022-06-17 RX ADMIN — FUROSEMIDE 40 MG: 10 INJECTION, SOLUTION INTRAMUSCULAR; INTRAVENOUS at 09:37

## 2022-06-17 RX ADMIN — WARFARIN SODIUM 5 MG: 5 TABLET ORAL at 18:57

## 2022-06-17 RX ADMIN — CARVEDILOL 6.25 MG: 6.25 TABLET, FILM COATED ORAL at 09:36

## 2022-06-17 RX ADMIN — SODIUM CHLORIDE, PRESERVATIVE FREE 10 ML: 5 INJECTION INTRAVENOUS at 18:57

## 2022-06-17 RX ADMIN — SODIUM CHLORIDE, PRESERVATIVE FREE 10 ML: 5 INJECTION INTRAVENOUS at 09:37

## 2022-06-17 RX ADMIN — SODIUM CHLORIDE, PRESERVATIVE FREE 10 ML: 5 INJECTION INTRAVENOUS at 05:26

## 2022-06-17 RX ADMIN — CARVEDILOL 6.25 MG: 6.25 TABLET, FILM COATED ORAL at 18:57

## 2022-06-17 RX ADMIN — PANTOPRAZOLE SODIUM 40 MG: 40 TABLET, DELAYED RELEASE ORAL at 09:36

## 2022-06-17 NOTE — PROGRESS NOTES
[facility] - Progress Note  Admit Date: 6/14/2022    F/u HFpEF/ MR/ edema    Assessment:     Hospital Problems  Never Reviewed          Codes Class Noted POA    Hypokalemia ICD-10-CM: E87.6  ICD-9-CM: 276.8  6/15/2022 No        Scrotal edema ICD-10-CM: N50.89  ICD-9-CM: 608.86  6/14/2022 Unknown        * (Principal) Acute on chronic diastolic heart failure (HCC) ICD-10-CM: I50.33  ICD-9-CM: 428.33  6/14/2022 Yes        Bilateral lower extremity pain ICD-10-CM: M79.604, M79.605  ICD-9-CM: 729.5  6/14/2022 Unknown        Paroxysmal atrial fibrillation (HCC) ICD-10-CM: I48.0  ICD-9-CM: 427.31  8/25/2020 Yes          -Admitted for scrotal edema and concern with fluid overload.  Albumin is 2.4, hypoalbuminemia contributing factor as well  -History of possible HFpEF.  Last echo in 2021 with normal EF and normal diastolic function at [facility]  · NST 1/2019: negative for ischemia/infarct. EF 78%. · Mild to moderate MR based on echo in 2021  - Paroxysmal atrial fibrillation. On chronic warfarin.  - Hypertension  - Tremor (debillitating). - Sleep apnea.  - H/o ETOH abuse (stopped 8/2019). - Hx of cocaine, marijuana in the 90's. -Morbid obesity  -Current major depression         Primary cardiologist : Tomasa Guerrier    Plan:     BP controlled  EF normal on echo  SCr stable with gentle diuresis  He is making slow progress, has lost about 2 lbs  Continue current care, will reeval on Sunday.    Please call sooner if needed    Subjective:     No new complaints but says doesn't feel that much better    Objective:      Patient Vitals for the past 8 hrs:   Temp Pulse Resp BP SpO2   06/17/22 0841 98 °F (36.7 °C) 62 20 115/77 93 %   06/17/22 0412 97.9 °F (36.6 °C) 67 20 135/70 93 %         Patient Vitals for the past 96 hrs:   Weight   06/16/22 2220 156.4 kg (344 lb 14.4 oz)   06/15/22 0948 149.7 kg (330 lb)   06/14/22 1344 149.7 kg (330 lb)         Intake/Output Summary (Last 24 hours) at 6/17/2022 1026  Last data filed at 6/17/2022 0610  Gross per 24 hour   Intake 120 ml   Output 1200 ml   Net -1080 ml       Physical Exam:  General:  alert, cooperative, no distress, appears stated age, obese  Neck:  nontender  Lungs:  clear to auscultation bilaterally  Heart:  regular rate and rhythm, S1, S2 normal, no murmur, click, rub or gallop  Abdomen:  abdomen is soft without significant tenderness, masses, organomegaly or guarding  Extremities:  +edema    Data Review:     Labs: Results:       Chemistry Recent Labs     06/17/22  0107 06/16/22  0945 06/15/22  0246 06/14/22 2010 06/14/22  1515   GLU 81 119* 83  --    < >    138 140  --    < >   K 3.5 3.5 3.1*  --    < >    104 103  --    < >   CO2 33* 33* 33*  --    < >   BUN 13 13 11  --    < >   CREA 0.76 0.83 0.85  --    < >   CA 8.8 9.2 8.7  --    < >   MG  --   --  2.2  --   --    AGAP 4 1* 4  --    < >   BUCR 17 16 13  --    < >   AP  --   --   --  122*  --    TP  --   --   --  7.1  --    ALB  --   --   --  2.9*  --    GLOB  --   --   --  4.2*  --    AGRAT  --   --   --  0.7*  --     < > = values in this interval not displayed.       CBC w/Diff Recent Labs     06/15/22  0246 06/14/22  1515   WBC 5.4 5.3   RBC 4.41 4.38   HGB 13.9 13.8   HCT 40.5 40.7   * 119*   GRANS  --  47   LYMPH  --  27   EOS  --  4      Cardiac Enzymes No results found for: CPK, CK, CKMMB, CKMB, RCK3, CKMBT, CKNDX, CKND1, MADELIN, TROPT, TROIQ, SAUD, TROPT, TNIPOC, BNP, BNPP   Coagulation Recent Labs     06/17/22  0107 06/16/22  0249   PTP 20.4* 21.0*   INR 1.7* 1.8*       Lipid Panel No results found for: CHOL, CHOLPOCT, CHOLX, CHLST, CHOLV, 028481, HDL, HDLP, LDL, LDLC, DLDLP, 999341, VLDLC, VLDL, TGLX, TRIGL, TRIGP, TGLPOCT, CHHD, CHHDX   BNP No results found for: BNP, BNPP, XBNPT   Liver Enzymes Recent Labs     06/14/22 2010   TP 7.1   ALB 2.9*   *      Digoxin    Thyroid Studies No results found for: T4, T3U, TSH, TSHEXT       06/14/22    ECHO ADULT FOLLOW-UP OR LIMITED 06/15/2022 6/15/2022    Interpretation Summary    Contrast used: Definity.   Technical qualifiers: Echo study was technically difficult due to patient's body habitus.   Left Ventricle: Normal left ventricular systolic function with a visually estimated EF of 55 - 60%. Not well visualized. Unable to assess wall thickness. Normal wall motion.   Right Ventricle: Not assessed due to poor image quality.   Pericardium: There is evidence of epicardial fat. Trivial pericardial effusion present. No indication of cardiac tamponade.     Signed by: Carolee Watkins MD on 6/15/2022  1:59 PM        Signed By: Renaldo gNuyen DO     June 17, 2022

## 2022-06-17 NOTE — ROUTINE PROCESS
SBAR completed with off-going nurse Sukhdeep Ramírez, MARV.    0875: Shift report performed along with skin assessment. 0930: Bedside care, meds given. 9303-2746: Bedside care. Bath and linen changed performed. 1250: Nurse at bedside feeding patient. Care was taken over by CNA. 1540: nurse rounds. Needs addressed. 1855: patient started on Coumadin 4mg po.    1900: Shift completed.

## 2022-06-17 NOTE — PROGRESS NOTES
Problem: Falls - Risk of  Goal: *Absence of Falls  Description: Document Azael Cazares Fall Risk and appropriate interventions in the flowsheet. Outcome: Progressing Towards Goal  Note: Fall Risk Interventions:  Mobility Interventions: Bed/chair exit alarm,Utilize walker, cane, or other assistive device,Patient to call before getting OOB         Medication Interventions: Bed/chair exit alarm,Patient to call before getting OOB    Elimination Interventions: Call light in reach,Patient to call for help with toileting needs    History of Falls Interventions: Bed/chair exit alarm,Door open when patient unattended,Room close to nurse's station         Problem: Pain  Goal: *Control of Pain  Outcome: Progressing Towards Goal  Goal: *PALLIATIVE CARE:  Alleviation of Pain  Outcome: Progressing Towards Goal     Problem: Pressure Injury - Risk of  Goal: *Prevention of pressure injury  Description: Document Jean Scale and appropriate interventions in the flowsheet.   Outcome: Progressing Towards Goal  Note: Pressure Injury Interventions:  Sensory Interventions: Assess changes in LOC,Avoid rigorous massage over bony prominences,Keep linens dry and wrinkle-free,Maintain/enhance activity level    Moisture Interventions: Absorbent underpads,Apply protective barrier, creams and emollients,Internal/External urinary devices    Activity Interventions: Increase time out of bed,PT/OT evaluation    Mobility Interventions: Float heels,Pressure redistribution bed/mattress (bed type)    Nutrition Interventions: Document food/fluid/supplement intake    Friction and Shear Interventions: Apply protective barrier, creams and emollients,Foam dressings/transparent film/skin sealants,Minimize layers,Transferring/repositioning devices                Problem: Patient Education: Go to Patient Education Activity  Goal: Patient/Family Education  Outcome: Progressing Towards Goal

## 2022-06-17 NOTE — PROGRESS NOTES
[facility]ist [facility]  Progress Note    Patient: Radha Baxter Age: 67 y.o. : 1950 MR#: [id number] SSN: [id number]  Date: 2022         Assessment/Plan:     Hospital Problems  Never Reviewed          Codes Class Noted POA    Hypokalemia ICD-10-CM: E87.6  ICD-9-CM: 276.8  6/15/2022 No        Scrotal edema ICD-10-CM: N50.89  ICD-9-CM: 608.86  2022 Unknown        * (Principal) Acute on chronic diastolic heart failure (HCC) ICD-10-CM: I50.33  ICD-9-CM: 428.33  2022 Yes        Bilateral lower extremity pain ICD-10-CM: M79.604, M79.605  ICD-9-CM: 729.5  2022 Unknown        Paroxysmal atrial fibrillation (HCC) ICD-10-CM: I48.0  ICD-9-CM: 427.31  2020 Yes            Acute on chronic HFpEF  - continue with IV diuresis  - monitor BMP - renal function is stable  - strict I&Os, daily weight  - echo noted  - appreciate cardiology assistance    Scrotal edema: 2/2 HF. Pain and swelling improving with diuresis. No s/sx of infection    PAF  -.cont coreg, warfarin  - daily INRs    Hypokalemia  - replace as needed and monitor       DVT Prophylaxis:  []Lovenox  []Hep SQ  []SCDs  [x]Coumadin   []On Heparin gtt []PO anticoagulant    Anticipated discharge: 1-2 days; PT recs SNF    Subjective:     Pt seen and evaluated, lying in bed, no acute distress.     Objective:   VS:   Visit Vitals  /77   Pulse 61   Temp 97.7 °F (36.5 °C)   Resp 20   Ht 6' (1.829 m)   Wt 156.4 kg (344 lb 14.4 oz)   SpO2 94%   BMI 46.78 kg/m²      Tmax/24hrs: Temp (24hrs), Av °F (36.7 °C), Min:97.5 °F (36.4 °C), Max:98.6 °F (37 °C)      Intake/Output Summary (Last 24 hours) at 2022 1720  Last data filed at 2022 0610  Gross per 24 hour   Intake 120 ml   Output 1200 ml   Net -1080 ml       General Appearance: NAD, conversant  HENT: normocephalic/atraumatic, moist mucus membranes  Neck: No JVD, supple  Lungs: CTA with normal respiratory effort  CV: RRR, no m/r/g  Abdomen: soft, non-tender, normal bowel sounds  Extremities: no cyanosis, 2+ pitting edema BLE  Neuro: No focal deficits, motor/sensory intact  Skin: Normal color, intact  Psych: appropriate affect    Current Facility-Administered Medications   Medication Dose Route Frequency    warfarin (COUMADIN) tablet 5 mg  5 mg Oral ONCE    WARFARIN INFORMATION NOTE (COUMADIN)   Other Q24H    sodium chloride (NS) flush 5-40 mL  5-40 mL IntraVENous Q8H    sodium chloride (NS) flush 5-40 mL  5-40 mL IntraVENous PRN    acetaminophen (TYLENOL) tablet 650 mg  650 mg Oral Q6H PRN    Or    acetaminophen (TYLENOL) suppository 650 mg  650 mg Rectal Q6H PRN    polyethylene glycol (MIRALAX) packet 17 g  17 g Oral DAILY PRN    ondansetron (ZOFRAN ODT) tablet 4 mg  4 mg Oral Q8H PRN    Or    ondansetron (ZOFRAN) injection 4 mg  4 mg IntraVENous Q6H PRN    furosemide (LASIX) injection 40 mg  40 mg IntraVENous BID    carvediloL (COREG) tablet 6.25 mg  6.25 mg Oral BID    FLUoxetine (PROzac) capsule 20 mg  20 mg Oral DAILY    pantoprazole (PROTONIX) tablet 40 mg  40 mg Oral ACB        Labs:    Recent Results (from the past 24 hour(s))   PROTHROMBIN TIME + INR    Collection Time: 06/17/22  1:07 AM   Result Value Ref Range    Prothrombin time 20.4 (H) 11.5 - 15.2 sec    INR 1.7 (H) 0.8 - 1.2     METABOLIC PANEL, BASIC    Collection Time: 06/17/22  1:07 AM   Result Value Ref Range    Sodium 141 136 - 145 mmol/L    Potassium 3.5 3.5 - 5.5 mmol/L    Chloride 104 100 - 111 mmol/L    CO2 33 (H) 21 - 32 mmol/L    Anion gap 4 3.0 - 18 mmol/L    Glucose 81 74 - 99 mg/dL    BUN 13 7.0 - 18 MG/DL    Creatinine 0.76 0.6 - 1.3 MG/DL    BUN/Creatinine ratio 17 12 - 20      GFR est AA >60 >60 ml/min/1.73m2    GFR est non-AA >60 >60 ml/min/1.73m2    Calcium 8.8 8.5 - 10.1 MG/DL       Imaging:  No results found.        Jesus Taylor MD    June 17, 2022 7:46 PM

## 2022-06-17 NOTE — PROGRESS NOTES
18    Re: Deedee Strauss  : 1994      To Whom It May Concern:  Deedee Strauss is under my care and has restrictions to work from home 2018-2018.      If you have any questions concerning this lette CM entered chart to assist with initial assessment.  will continue to monitor and assist with transition of care needs.     JENNY Man, RN  Care Management  Pager # [phone]

## 2022-06-17 NOTE — PROGRESS NOTES
Warfarin Dosing - Pharmacy Consult Note  Consulting Provider: EULALIA Hester  Indication: History of VTE/PE and Atrial Fibrillation  Warfarin Dose prior to admission: 30 mg/week (5 mg po daily except Monday and Friday, he takes 2.5 mg)  Anticoag visits from [facility] in [facility]   Concurrent anticoagulants/antiplatelets: none  Significant Drug Interactions: No obvious interactions  Recent Labs     06/17/22  0107 06/16/22  0249 06/15/22  0246 06/14/22  2105 06/14/22  1515   INR 1.7* 1.8* 2.1*   < >  --    HGB  --   --  13.9  --  13.8   PLT  --   --  113*  --  119*    < > = values in this interval not displayed. [unfilled]  Date      INR       Dose  6/17        1.7        5 mg  6/16        1.8        5 mg  6/15         2.1       5 mg    Assessment/Plan  Goal INR: 2 - 3  5 mg dose Warfarin for Friday, 6/17   Active problem list reviewed. INR orders are placed. Chart reviewed for pertinent labs, drug/diet interactions, and past doses. Documentation of patient's clinical condition was reviewed. Pharmacy Dosing:  Pharmacy will continue to follow.

## 2022-06-17 NOTE — PROGRESS NOTES
Bedside shift change report given to Stew Delarosa RN (oncoming nurse) by Author MARV Morris (offgoing nurse). Report included the following information SBAR, Kardex, MAR and Recent Results.

## 2022-06-17 NOTE — PROGRESS NOTES
Reason for Admission:  Fluid overload [E87.70]  Scrotal edema [N50.89]  Bilateral lower extremity pain [M79.604, M79.605]                 RUR Score:  12%           Plan for utilizing home health:    Yes, TBD                      Likelihood of Readmission:   LOW                         Transition of Care Plan:              Initial assessment completed with patient. Cognitive status of patient: oriented to time, place, person and situation. Face sheet information confirmed:  yes. The patient designates daughter Conner Pineda to participate in his discharge plan and to receive any needed information. This patient lives in a group home, [address] [place] [address]     Patient is able to navigate steps as needed. Prior to hospitalization, patient was considered to be independent with ADLs/IADLS : yes . Patient has a current ACP document on file: yes      Healthcare Decision Maker: The patient's transport is TBD upon discharge. The patient already has W/C,  medical equipment available in the home. Patient is not currently active with home health. Patient has stayed in a skilled nursing facility or rehab. Was  stay within last 60 days : no. This patient is on dialysis :no     Currently, the discharge plan is Group Home with [address] [place] [address] [place] The patient states that he can obtain his medications from the pharmacy, and take his medications as directed. Pays out of pocket    Patient's current insurance is  Medicare A and B, Not prescription coverage       Care Management Interventions  PCP Verified by CM: Yes  Mode of Transport at Discharge:  Other (see comment)  Transition of Care Consult (CM Consult): Home Health  Physical Therapy Consult: Yes  Occupational Therapy Consult: Yes  Support Systems: Adult Group Home  Confirm Follow Up Transport: Other (see comment) (group home)  Discharge Location  Patient Expects to be Discharged to[de-identified] Home with home health       will continue to monitor and assist with transition of care needs.     JENNY Man, RN  Care Management  Pager # [phone]

## 2022-06-17 NOTE — PROGRESS NOTES
Bedside and Verbal shift change report given to April, MARV (oncoming nurse) by Damir Ace RN (offgoing nurse). Report included the following information SBAR, Kardex, Intake/Output, MAR and Recent Results.

## 2022-06-17 NOTE — PROGRESS NOTES
Wound Prevention Checklist    Patient: Chance Dominguez (77 y.o. male)  Date: 6/16/2022  Diagnosis: Fluid overload [E87.70]  Scrotal edema [N50.89]  Bilateral lower extremity pain [M79.604, M79.605] Acute on chronic diastolic heart failure (HCC)    Precautions: Skin,Fall       []  Heel prevention boots placed on patient    [x]  Patient turned q2h during shift    []  Lift team ordered    []  Patient on Anne bed/Specialty bed    [x]  Each Wound is documented during shift (Stage, Color, drainage, odor, measurements, and dressings)    Noted on skin during shift change:  Redness/excoriation on R abd fold > L  +3-4 edema on scrotum, red, warm to touch  B heels - mepilex on  R great toe - callus  B legs - scattered scabs  Primofit in place    [x]  Dual skin checks done at bedside during shift report with Blanca Artis RN

## 2022-06-18 LAB
ANION GAP SERPL CALC-SCNC: 4 MMOL/L (ref 3–18)
BUN SERPL-MCNC: 15 MG/DL (ref 7–18)
BUN/CREAT SERPL: 19 (ref 12–20)
CALCIUM SERPL-MCNC: 9.1 MG/DL (ref 8.5–10.1)
CHLORIDE SERPL-SCNC: 104 MMOL/L (ref 100–111)
CO2 SERPL-SCNC: 32 MMOL/L (ref 21–32)
CREAT SERPL-MCNC: 0.79 MG/DL (ref 0.6–1.3)
GLUCOSE SERPL-MCNC: 94 MG/DL (ref 74–99)
INR PPP: 1.8 (ref 0.8–1.2)
POTASSIUM SERPL-SCNC: 3.4 MMOL/L (ref 3.5–5.5)
PROTHROMBIN TIME: 21.2 SEC (ref 11.5–15.2)
SODIUM SERPL-SCNC: 140 MMOL/L (ref 136–145)

## 2022-06-18 PROCEDURE — 97530 THERAPEUTIC ACTIVITIES: CPT

## 2022-06-18 PROCEDURE — 74011000250 HC RX REV CODE- 250: Performed by: NURSE PRACTITIONER

## 2022-06-18 PROCEDURE — 80048 BASIC METABOLIC PNL TOTAL CA: CPT

## 2022-06-18 PROCEDURE — 97110 THERAPEUTIC EXERCISES: CPT

## 2022-06-18 PROCEDURE — 36415 COLL VENOUS BLD VENIPUNCTURE: CPT

## 2022-06-18 PROCEDURE — 74011250637 HC RX REV CODE- 250/637: Performed by: NURSE PRACTITIONER

## 2022-06-18 PROCEDURE — 74011250637 HC RX REV CODE- 250/637: Performed by: PHYSICIAN ASSISTANT

## 2022-06-18 PROCEDURE — 85610 PROTHROMBIN TIME: CPT

## 2022-06-18 PROCEDURE — 99232 SBSQ HOSP IP/OBS MODERATE 35: CPT | Performed by: HOSPITALIST

## 2022-06-18 PROCEDURE — 74011250636 HC RX REV CODE- 250/636: Performed by: NURSE PRACTITIONER

## 2022-06-18 PROCEDURE — 2709999900 HC NON-CHARGEABLE SUPPLY

## 2022-06-18 PROCEDURE — 65270000046 HC RM TELEMETRY

## 2022-06-18 RX ORDER — WARFARIN SODIUM 5 MG/1
5 TABLET ORAL ONCE
Status: COMPLETED | OUTPATIENT
Start: 2022-06-18 | End: 2022-06-18

## 2022-06-18 RX ADMIN — FUROSEMIDE 40 MG: 10 INJECTION, SOLUTION INTRAMUSCULAR; INTRAVENOUS at 17:15

## 2022-06-18 RX ADMIN — CARVEDILOL 6.25 MG: 6.25 TABLET, FILM COATED ORAL at 17:15

## 2022-06-18 RX ADMIN — SODIUM CHLORIDE, PRESERVATIVE FREE 10 ML: 5 INJECTION INTRAVENOUS at 14:00

## 2022-06-18 RX ADMIN — FUROSEMIDE 40 MG: 10 INJECTION, SOLUTION INTRAMUSCULAR; INTRAVENOUS at 10:09

## 2022-06-18 RX ADMIN — CARVEDILOL 6.25 MG: 6.25 TABLET, FILM COATED ORAL at 10:09

## 2022-06-18 RX ADMIN — WARFARIN SODIUM 5 MG: 5 TABLET ORAL at 19:06

## 2022-06-18 RX ADMIN — FLUOXETINE 20 MG: 20 CAPSULE ORAL at 10:08

## 2022-06-18 RX ADMIN — PANTOPRAZOLE SODIUM 40 MG: 40 TABLET, DELAYED RELEASE ORAL at 10:09

## 2022-06-18 RX ADMIN — SODIUM CHLORIDE, PRESERVATIVE FREE 10 ML: 5 INJECTION INTRAVENOUS at 21:35

## 2022-06-18 NOTE — PROGRESS NOTES
Problem: Mobility Impaired (Adult and Pediatric)  Goal: *Acute Goals and Plan of Care (Insert Text)  Description: Physical Therapy Goals  Initiated 6/15/2022 and to be accomplished within 7 day(s)  1. Patient will move from supine to sit and sit to supine , scoot up and down, and roll side to side in bed with modified independence. 2.  Patient will transfer from bed to chair and chair to bed with modified independence using the least restrictive device. 3.  Patient will perform sit to stand with modified independence. 4.  Patient will ambulate with modified independence for 50 feet with the least restrictive device. 5.  Patient will ascend/descend 4 stairs with R handrail(s) with supervision/set-up. PLOF: Pt reporting independent with mobility in 2 story group home with room on first floor. Pt reports having 4 DB with R handrail. Pt reports needing assist for feeding due to tremors and gets assist with bathing with shower chair. Outcome: Progressing Towards Goal   PHYSICAL THERAPY TREATMENT    Patient: Shyann Gallegos (77 y.o. male)  Date: 6/18/2022  Diagnosis: Fluid overload [E87.70]  Scrotal edema [N50.89]  Bilateral lower extremity pain [M79.604, M79.605] Acute on chronic diastolic heart failure (HCC)       Precautions: Skin,Fall    ASSESSMENT:  Patient presented today while taking a nap. Patient responded without any issues with verbal greeting. Patient was willing and agreeable to therapy session this date. Patient was able to complete all exercises and activities without any reports of pain or adverse effects. Patient was educated on the benefits of exercises and activities throughout the day to improve carryover of training benefits.   Progression toward goals:   [x]      Improving appropriately and progressing toward goals  []      Improving slowly and progressing toward goals  []      Not making progress toward goals and plan of care will be adjusted     PLAN:  Patient continues to benefit from skilled intervention to address the above impairments. Continue treatment per established plan of care. Discharge Recommendations:  Rehab  Further Equipment Recommendations for Discharge:  rolling walker and N/A     SUBJECTIVE:   Patient stated doing okay today.     OBJECTIVE DATA SUMMARY:   Critical Behavior:  Neurologic State: Alert  Orientation Level: Appropriate for age  Cognition: Appropriate decision making  Safety/Judgement: Awareness of environment,Fall prevention    Functional Mobility Training:  Bed Mobility:  Supine to Sit: Supervision  Sit to Supine: Supervision  Scooting: Supervision    Transfers:  Sit to Stand: Stand-by assistance  Stand to Sit: Stand-by assistance    Balance:  Sitting: Intact  Sitting - Static: Good (unsupported)  Sitting - Dynamic: Good (unsupported)  Standing: Impaired; With support  Standing - Static: Fair  Standing - Dynamic : Fair     Therapeutic Activities:  Sit to stand on standard height and supervision assist x 5 reps x 3 sets with 1-2 mins rest periods d/t shortness of breath. Therapeutic Exercises:       EXERCISE   Sets   Reps   Active Active Assist   Passive Self ROM   Comments   Ankle Pumps    [] [] [] []    Quad Sets/Glut Sets    [] [] [] [] Hold for 5 secs   Hamstring Sets   [] [] [] []    Short Arc Quads   [] [] [] []    Heel Slides   [] [] [] []    Straight Leg Raises   [] [] [] []    Hip Add   [] [] [] [] Hold for 5 secs, w/ pillow squeeze   Long Arc Quads 3 10 [x] [] [] []    Seated Marching 3 10 [x] [] [] []    Standing Marching   [] [] [] []       [] [] [] []      Activity Tolerance:   Please refer to the flowsheet for vital signs taken during this treatment.   After treatment:   [x] Patient left in no apparent distress sitting up in chair  [x] Patient left in no apparent distress in bed  [x] Call bell left within reach  [] Nursing notified  [] Caregiver present  [] Bed alarm activated  [] SCDs applied    COMMUNICATION/EDUCATION:   [x]         Role of Physical Therapy in the acute care setting. [x]         Fall prevention education was provided and the patient/caregiver indicated understanding. [x]         Patient/family have participated as able in working toward goals and plan of care. []         Patient/family agree to work toward stated goals and plan of care. []         Patient understands intent and goals of therapy, but is neutral about his/her participation.   []         Patient is unable to participate in stated goals/plan of care: ongoing with therapy staff.  []         Other:        Lesly Romero, PT   Time Calculation: 23 mins

## 2022-06-18 NOTE — PROGRESS NOTES
Warfarin Dosing - Pharmacy Consult Note  Consulting Provider: EULALIA Hester  Indication: History of VTE/PE and Atrial Fibrillation  Warfarin Dose prior to admission: 30 mg/week (5 mg po daily except Monday and Friday, he takes 2.5 mg)  Anticoag visits from [facility] in [facility]   Concurrent anticoagulants/antiplatelets: none  Significant Drug Interactions: No obvious interactions  Recent Labs     06/18/22  0046 06/17/22  0107 06/16/22  0249   INR 1.8* 1.7* 1.8*     [unfilled]  Date      INR       Dose  6/18        1.8        5 mg  6/17        1.7        5 mg  6/16        1.8        5 mg  6/15         2.1       5 mg      Assessment/Plan  Goal INR: 2 - 3  5 mg dose Warfarin for Saturday, 6/18  Active problem list reviewed. INR orders are placed. Chart reviewed for pertinent labs, drug/diet interactions, and past doses. Documentation of patient's clinical condition was reviewed. Pharmacy Dosing:  Pharmacy will continue to follow.

## 2022-06-18 NOTE — PROGRESS NOTES
Problem: Falls - Risk of  Goal: *Absence of Falls  Description: Document Sylvie Turner Fall Risk and appropriate interventions in the flowsheet. Outcome: Progressing Towards Goal  Note: Fall Risk Interventions:  Mobility Interventions: Bed/chair exit alarm,Utilize walker, cane, or other assistive device,Patient to call before getting OOB         Medication Interventions: Bed/chair exit alarm,Patient to call before getting OOB    Elimination Interventions: Call light in reach,Patient to call for help with toileting needs    History of Falls Interventions: Bed/chair exit alarm,Door open when patient unattended,Room close to nurse's station         Problem: Patient Education: Go to Patient Education Activity  Goal: Patient/Family Education  Outcome: Progressing Towards Goal     Problem: Pain  Goal: *Control of Pain  Outcome: Progressing Towards Goal  Goal: *PALLIATIVE CARE:  Alleviation of Pain  Outcome: Progressing Towards Goal     Problem: Patient Education: Go to Patient Education Activity  Goal: Patient/Family Education  Outcome: Progressing Towards Goal     Problem: Patient Education: Go to Patient Education Activity  Goal: Patient/Family Education  Outcome: Progressing Towards Goal     Problem: Patient Education: Go to Patient Education Activity  Goal: Patient/Family Education  Outcome: Progressing Towards Goal     Problem: Pressure Injury - Risk of  Goal: *Prevention of pressure injury  Description: Document Jean Scale and appropriate interventions in the flowsheet.   Outcome: Progressing Towards Goal  Note: Pressure Injury Interventions:  Sensory Interventions: Maintain/enhance activity level,Minimize linen layers,Check visual cues for pain    Moisture Interventions: Absorbent underpads,Apply protective barrier, creams and emollients,Internal/External urinary devices    Activity Interventions: Increase time out of bed,PT/OT evaluation    Mobility Interventions: PT/OT evaluation,Float heels    Nutrition Interventions: Document food/fluid/supplement intake    Friction and Shear Interventions: Apply protective barrier, creams and emollients,Foam dressings/transparent film/skin sealants,Minimize layers,Transferring/repositioning devices                Problem: Patient Education: Go to Patient Education Activity  Goal: Patient/Family Education  Outcome: Progressing Towards Goal

## 2022-06-18 NOTE — PROGRESS NOTES
[facility] Hospitalist Group  Progress Note    Patient: John De Los Santos Age: 67 y.o. : 1950 MR#: [id number] SSN: [id number]  Date: 2022         Assessment/Plan:     Hospital Problems  Never Reviewed          Codes Class Noted POA    Hypokalemia ICD-10-CM: E87.6  ICD-9-CM: 276.8  6/15/2022 No        Scrotal edema ICD-10-CM: N50.89  ICD-9-CM: 608.86  2022 Unknown        * (Principal) Acute on chronic diastolic heart failure (HCC) ICD-10-CM: I50.33  ICD-9-CM: 428.33  2022 Yes        Bilateral lower extremity pain ICD-10-CM: M79.604, M79.605  ICD-9-CM: 729.5  2022 Unknown        Paroxysmal atrial fibrillation (HCC) ICD-10-CM: I48.0  ICD-9-CM: 427.31  2020 Yes            Acute on chronic HFpEF  - continue with IV diuresis  - monitor BMP - renal function is stable  - strict I&Os, daily weight  - echo noted  - appreciate cardiology assistance    Scrotal edema: 2/2 HF. Pain and swelling improving with diuresis. No s/sx of infection    PAF  -.cont coreg, warfarin  - daily INRs    Hypokalemia  - replace as needed and monitor    Morbid Obesity   Counseled on weight loss      DVT Prophylaxis:  []Lovenox  []Hep SQ  []SCDs  [x]Coumadin   []On Heparin gtt []PO anticoagulant    Anticipated discharge: 1-2 days; PT recs SNF    Subjective:     Pt seen and evaluated, lying in bed, no acute distress.     Objective:   VS:   Visit Vitals  BP (!) 140/73 (BP 1 Location: Left upper arm, BP Patient Position: Semi fowlers)   Pulse 66   Temp 98.6 °F (37 °C)   Resp 18   Ht 6' (1.829 m)   Wt 156.4 kg (344 lb 14.4 oz)   SpO2 94%   BMI 46.78 kg/m²      Tmax/24hrs: Temp (24hrs), Av.1 °F (36.7 °C), Min:97.1 °F (36.2 °C), Max:98.6 °F (37 °C)      Intake/Output Summary (Last 24 hours) at 2022 1419  Last data filed at 2022 1214  Gross per 24 hour   Intake 840 ml   Output --   Net 840 ml       General Appearance: NAD, conversant  HENT: normocephalic/atraumatic, moist mucus membranes  Neck: No JVD, supple  Lungs: CTA with normal respiratory effort  CV: RRR, no m/r/g  Abdomen: soft, non-tender, normal bowel sounds  Extremities: no cyanosis, 2+ pitting edema BLE  Neuro: No focal deficits, motor/sensory intact  Skin: Normal color, intact  Psych: appropriate affect    Current Facility-Administered Medications   Medication Dose Route Frequency    warfarin (COUMADIN) tablet 5 mg  5 mg Oral ONCE    WARFARIN INFORMATION NOTE (COUMADIN)   Other Q24H    sodium chloride (NS) flush 5-40 mL  5-40 mL IntraVENous Q8H    sodium chloride (NS) flush 5-40 mL  5-40 mL IntraVENous PRN    acetaminophen (TYLENOL) tablet 650 mg  650 mg Oral Q6H PRN    Or    acetaminophen (TYLENOL) suppository 650 mg  650 mg Rectal Q6H PRN    polyethylene glycol (MIRALAX) packet 17 g  17 g Oral DAILY PRN    ondansetron (ZOFRAN ODT) tablet 4 mg  4 mg Oral Q8H PRN    Or    ondansetron (ZOFRAN) injection 4 mg  4 mg IntraVENous Q6H PRN    furosemide (LASIX) injection 40 mg  40 mg IntraVENous BID    carvediloL (COREG) tablet 6.25 mg  6.25 mg Oral BID    FLUoxetine (PROzac) capsule 20 mg  20 mg Oral DAILY    pantoprazole (PROTONIX) tablet 40 mg  40 mg Oral ACB        Labs:    Recent Results (from the past 24 hour(s))   PROTHROMBIN TIME + INR    Collection Time: 06/18/22 12:46 AM   Result Value Ref Range    Prothrombin time 21.2 (H) 11.5 - 15.2 sec    INR 1.8 (H) 0.8 - 1.2     METABOLIC PANEL, BASIC    Collection Time: 06/18/22 12:46 AM   Result Value Ref Range    Sodium 140 136 - 145 mmol/L    Potassium 3.4 (L) 3.5 - 5.5 mmol/L    Chloride 104 100 - 111 mmol/L    CO2 32 21 - 32 mmol/L    Anion gap 4 3.0 - 18 mmol/L    Glucose 94 74 - 99 mg/dL    BUN 15 7.0 - 18 MG/DL    Creatinine 0.79 0.6 - 1.3 MG/DL    BUN/Creatinine ratio 19 12 - 20      GFR est AA >60 >60 ml/min/1.73m2    GFR est non-AA >60 >60 ml/min/1.73m2    Calcium 9.1 8.5 - 10.1 MG/DL       Imaging:  No results found.        Ace Kennedy MD    June 18, 2022 7:46 PM

## 2022-06-19 LAB
ANION GAP SERPL CALC-SCNC: 4 MMOL/L (ref 3–18)
BUN SERPL-MCNC: 14 MG/DL (ref 7–18)
BUN/CREAT SERPL: 17 (ref 12–20)
CALCIUM SERPL-MCNC: 9 MG/DL (ref 8.5–10.1)
CHLORIDE SERPL-SCNC: 105 MMOL/L (ref 100–111)
CO2 SERPL-SCNC: 31 MMOL/L (ref 21–32)
CREAT SERPL-MCNC: 0.81 MG/DL (ref 0.6–1.3)
GLUCOSE SERPL-MCNC: 90 MG/DL (ref 74–99)
INR PPP: 2 (ref 0.8–1.2)
POTASSIUM SERPL-SCNC: 3.5 MMOL/L (ref 3.5–5.5)
PROTHROMBIN TIME: 22.8 SEC (ref 11.5–15.2)
SODIUM SERPL-SCNC: 140 MMOL/L (ref 136–145)

## 2022-06-19 PROCEDURE — 74011000250 HC RX REV CODE- 250: Performed by: NURSE PRACTITIONER

## 2022-06-19 PROCEDURE — 80048 BASIC METABOLIC PNL TOTAL CA: CPT

## 2022-06-19 PROCEDURE — 74011250637 HC RX REV CODE- 250/637: Performed by: PHYSICIAN ASSISTANT

## 2022-06-19 PROCEDURE — 65270000046 HC RM TELEMETRY

## 2022-06-19 PROCEDURE — 85610 PROTHROMBIN TIME: CPT

## 2022-06-19 PROCEDURE — 2709999900 HC NON-CHARGEABLE SUPPLY

## 2022-06-19 PROCEDURE — 36415 COLL VENOUS BLD VENIPUNCTURE: CPT

## 2022-06-19 PROCEDURE — 74011250637 HC RX REV CODE- 250/637: Performed by: NURSE PRACTITIONER

## 2022-06-19 PROCEDURE — 99232 SBSQ HOSP IP/OBS MODERATE 35: CPT | Performed by: HOSPITALIST

## 2022-06-19 PROCEDURE — 74011250636 HC RX REV CODE- 250/636: Performed by: NURSE PRACTITIONER

## 2022-06-19 RX ORDER — WARFARIN SODIUM 5 MG/1
5 TABLET ORAL ONCE
Status: COMPLETED | OUTPATIENT
Start: 2022-06-19 | End: 2022-06-19

## 2022-06-19 RX ADMIN — SODIUM CHLORIDE, PRESERVATIVE FREE 10 ML: 5 INJECTION INTRAVENOUS at 05:28

## 2022-06-19 RX ADMIN — WARFARIN SODIUM 5 MG: 5 TABLET ORAL at 17:43

## 2022-06-19 RX ADMIN — ONDANSETRON 4 MG: 2 INJECTION INTRAMUSCULAR; INTRAVENOUS at 10:41

## 2022-06-19 RX ADMIN — SODIUM CHLORIDE, PRESERVATIVE FREE 10 ML: 5 INJECTION INTRAVENOUS at 22:31

## 2022-06-19 RX ADMIN — CARVEDILOL 6.25 MG: 6.25 TABLET, FILM COATED ORAL at 17:43

## 2022-06-19 RX ADMIN — CARVEDILOL 6.25 MG: 6.25 TABLET, FILM COATED ORAL at 10:32

## 2022-06-19 RX ADMIN — FUROSEMIDE 40 MG: 10 INJECTION, SOLUTION INTRAMUSCULAR; INTRAVENOUS at 17:43

## 2022-06-19 RX ADMIN — FUROSEMIDE 40 MG: 10 INJECTION, SOLUTION INTRAMUSCULAR; INTRAVENOUS at 10:31

## 2022-06-19 RX ADMIN — SODIUM CHLORIDE, PRESERVATIVE FREE 10 ML: 5 INJECTION INTRAVENOUS at 17:47

## 2022-06-19 RX ADMIN — PANTOPRAZOLE SODIUM 40 MG: 40 TABLET, DELAYED RELEASE ORAL at 10:32

## 2022-06-19 RX ADMIN — FLUOXETINE 20 MG: 20 CAPSULE ORAL at 10:32

## 2022-06-19 NOTE — PROGRESS NOTES
Warfarin Dosing - Pharmacy Consult Note  Consulting Provider: EULALIA Hester  Indication: History of VTE/PE and Atrial Fibrillation  Warfarin Dose prior to admission: 30 mg/week (5 mg po daily except Monday and Friday, he takes 2.5 mg)  Anticoag visits from [facility] in [facility]   Concurrent anticoagulants/antiplatelets: none  Significant Drug Interactions: No obvious interactions  Recent Labs     06/19/22  0135 06/18/22  0046 06/17/22  0107   INR 2.0* 1.8* 1.7*     [unfilled]  Date      INR       Dose  6/19        2.0        5 mg  6/18        1.8        5 mg  6/17        1.7        5 mg  6/16        1.8        5 mg  6/15         2.1       5 mg      Assessment/Plan  Goal INR: 2 - 3  5 mg dose Warfarin for Sunday, 6/19  Active problem list reviewed. INR orders are placed. Chart reviewed for pertinent labs, drug/diet interactions, and past doses. Documentation of patient's clinical condition was reviewed. Pharmacy Dosing:  Pharmacy will continue to follow.

## 2022-06-19 NOTE — PROGRESS NOTES
Problem: Falls - Risk of  Goal: *Absence of Falls  Description: Document Shahla Ventura Fall Risk and appropriate interventions in the flowsheet. Outcome: Progressing Towards Goal  Note: Fall Risk Interventions:  Mobility Interventions: Patient to call before getting OOB,Bed/chair exit alarm         Medication Interventions: Bed/chair exit alarm    Elimination Interventions: Call light in reach    History of Falls Interventions: Bed/chair exit alarm         Problem: Patient Education: Go to Patient Education Activity  Goal: Patient/Family Education  Outcome: Progressing Towards Goal     Problem: Pain  Goal: *Control of Pain  Outcome: Progressing Towards Goal     Problem: Pressure Injury - Risk of  Goal: *Prevention of pressure injury  Description: Document Jean Scale and appropriate interventions in the flowsheet.   Outcome: Progressing Towards Goal  Note: Pressure Injury Interventions:  Sensory Interventions: Maintain/enhance activity level    Moisture Interventions: Absorbent underpads,Apply protective barrier, creams and emollients    Activity Interventions: PT/OT evaluation    Mobility Interventions: PT/OT evaluation    Nutrition Interventions: Document food/fluid/supplement intake    Friction and Shear Interventions: Apply protective barrier, creams and emollients

## 2022-06-19 NOTE — PROGRESS NOTES
Wound Prevention Checklist    Patient: Shyann Gallegos (35 y.o. male)  Date: 6/19/2022  Diagnosis: Fluid overload [E87.70]  Scrotal edema [N50.89]  Bilateral lower extremity pain [M79.604, M79.605] Acute on chronic diastolic heart failure (HCC)    Precautions: Skin,Fall       [x]  Heel prevention boots placed on patient    [x]  Patient turned q2h during shift    [x]  Lift team ordered    [x]  Patient on [place] bed/Specialty bed    [x]  Each Wound is documented during shift (Stage, Color, drainage, odor, measurements, and dressings)    [x]  Dual skin checks done at bedside during shift report with off going nurse  Roger Kearney RN

## 2022-06-19 NOTE — ROUTINE PROCESS
Bedside and Verbal shift change report given to Zulema Salguero Rn (oncoming nurse) by Homero Lees RN (offgoing nurse). Report included the following information SBAR, Kardex, Intake/Output, MAR and Recent Results.

## 2022-06-19 NOTE — PROGRESS NOTES
Problem: Falls - Risk of  Goal: *Absence of Falls  Description: Document Steven Ventura Fall Risk and appropriate interventions in the flowsheet. Outcome: Progressing Towards Goal  Note: Fall Risk Interventions:  Mobility Interventions: Bed/chair exit alarm         Medication Interventions: Bed/chair exit alarm    Elimination Interventions: Call light in reach    History of Falls Interventions: Bed/chair exit alarm         Problem: Patient Education: Go to Patient Education Activity  Goal: Patient/Family Education  Outcome: Progressing Towards Goal     Problem: Pain  Goal: *Control of Pain  Outcome: Progressing Towards Goal  Goal: *PALLIATIVE CARE:  Alleviation of Pain  Outcome: Progressing Towards Goal     Problem: Pain  Goal: *Control of Pain  Outcome: Progressing Towards Goal  Goal: *PALLIATIVE CARE:  Alleviation of Pain  Outcome: Progressing Towards Goal     Problem: Patient Education: Go to Patient Education Activity  Goal: Patient/Family Education  Outcome: Progressing Towards Goal     Problem: Patient Education: Go to Patient Education Activity  Goal: Patient/Family Education  Outcome: Progressing Towards Goal     Problem: Patient Education: Go to Patient Education Activity  Goal: Patient/Family Education  Outcome: Progressing Towards Goal     Problem: Pressure Injury - Risk of  Goal: *Prevention of pressure injury  Description: Document Jean Scale and appropriate interventions in the flowsheet.   Outcome: Progressing Towards Goal  Note: Pressure Injury Interventions:  Sensory Interventions: Maintain/enhance activity level    Moisture Interventions: Absorbent underpads,Check for incontinence Q2 hours and as needed    Activity Interventions: Increase time out of bed    Mobility Interventions: PT/OT evaluation    Nutrition Interventions: Document food/fluid/supplement intake    Friction and Shear Interventions: Apply protective barrier, creams and emollients                Problem: Patient Education: Go to Patient Education Activity  Goal: Patient/Family Education  Outcome: Progressing Towards Goal

## 2022-06-19 NOTE — PROGRESS NOTES
Bedside shift change report given to Jen Morrow RN (oncoming nurse) by Jerome Kohli (offgoing nurse). Report included the following information SBAR, Kardex, MAR and Quality Measures.

## 2022-06-20 ENCOUNTER — APPOINTMENT (OUTPATIENT)
Dept: GENERAL RADIOLOGY | Age: 72
DRG: 291 | End: 2022-06-20
Attending: HOSPITALIST
Payer: MEDICARE

## 2022-06-20 LAB
ANION GAP SERPL CALC-SCNC: 5 MMOL/L (ref 3–18)
BASOPHILS # BLD: 0.1 K/UL (ref 0–0.1)
BASOPHILS NFR BLD: 1 % (ref 0–2)
BUN SERPL-MCNC: 15 MG/DL (ref 7–18)
BUN/CREAT SERPL: 16 (ref 12–20)
CALCIUM SERPL-MCNC: 9.1 MG/DL (ref 8.5–10.1)
CHLORIDE SERPL-SCNC: 102 MMOL/L (ref 100–111)
CO2 SERPL-SCNC: 32 MMOL/L (ref 21–32)
CREAT SERPL-MCNC: 0.93 MG/DL (ref 0.6–1.3)
DIFFERENTIAL METHOD BLD: ABNORMAL
EOSINOPHIL # BLD: 0 K/UL (ref 0–0.4)
EOSINOPHIL NFR BLD: 0 % (ref 0–5)
ERYTHROCYTE [DISTWIDTH] IN BLOOD BY AUTOMATED COUNT: 12.6 % (ref 11.6–14.5)
GLUCOSE SERPL-MCNC: 90 MG/DL (ref 74–99)
HCT VFR BLD AUTO: 42.5 % (ref 36–48)
HGB BLD-MCNC: 14.2 G/DL (ref 13–16)
IMM GRANULOCYTES # BLD AUTO: 0.1 K/UL (ref 0–0.04)
IMM GRANULOCYTES NFR BLD AUTO: 1 % (ref 0–0.5)
INR PPP: 2.1 (ref 0.8–1.2)
LYMPHOCYTES # BLD: 1.6 K/UL (ref 0.9–3.6)
LYMPHOCYTES NFR BLD: 11 % (ref 21–52)
MCH RBC QN AUTO: 31.5 PG (ref 24–34)
MCHC RBC AUTO-ENTMCNC: 33.4 G/DL (ref 31–37)
MCV RBC AUTO: 94.2 FL (ref 78–100)
MONOCYTES # BLD: 2.6 K/UL (ref 0.05–1.2)
MONOCYTES NFR BLD: 17 % (ref 3–10)
NEUTS SEG # BLD: 11 K/UL (ref 1.8–8)
NEUTS SEG NFR BLD: 71 % (ref 40–73)
NRBC # BLD: 0 K/UL (ref 0–0.01)
NRBC BLD-RTO: 0 PER 100 WBC
PLATELET # BLD AUTO: 109 K/UL (ref 135–420)
PMV BLD AUTO: 11.6 FL (ref 9.2–11.8)
POTASSIUM SERPL-SCNC: 3.5 MMOL/L (ref 3.5–5.5)
PROTHROMBIN TIME: 23.6 SEC (ref 11.5–15.2)
RBC # BLD AUTO: 4.51 M/UL (ref 4.35–5.65)
SODIUM SERPL-SCNC: 139 MMOL/L (ref 136–145)
WBC # BLD AUTO: 15.4 K/UL (ref 4.6–13.2)

## 2022-06-20 PROCEDURE — 85025 COMPLETE CBC W/AUTO DIFF WBC: CPT

## 2022-06-20 PROCEDURE — 74011250636 HC RX REV CODE- 250/636: Performed by: NURSE PRACTITIONER

## 2022-06-20 PROCEDURE — 36415 COLL VENOUS BLD VENIPUNCTURE: CPT

## 2022-06-20 PROCEDURE — 74011000250 HC RX REV CODE- 250: Performed by: NURSE PRACTITIONER

## 2022-06-20 PROCEDURE — 71045 X-RAY EXAM CHEST 1 VIEW: CPT

## 2022-06-20 PROCEDURE — 80048 BASIC METABOLIC PNL TOTAL CA: CPT

## 2022-06-20 PROCEDURE — 99232 SBSQ HOSP IP/OBS MODERATE 35: CPT | Performed by: INTERNAL MEDICINE

## 2022-06-20 PROCEDURE — 99232 SBSQ HOSP IP/OBS MODERATE 35: CPT | Performed by: HOSPITALIST

## 2022-06-20 PROCEDURE — 74011250637 HC RX REV CODE- 250/637: Performed by: NURSE PRACTITIONER

## 2022-06-20 PROCEDURE — 74011250637 HC RX REV CODE- 250/637: Performed by: HOSPITALIST

## 2022-06-20 PROCEDURE — 65270000046 HC RM TELEMETRY

## 2022-06-20 PROCEDURE — 85610 PROTHROMBIN TIME: CPT

## 2022-06-20 RX ORDER — WARFARIN SODIUM 5 MG/1
5 TABLET ORAL EVERY EVENING
Status: COMPLETED | OUTPATIENT
Start: 2022-06-20 | End: 2022-06-20

## 2022-06-20 RX ORDER — FUROSEMIDE 40 MG/1
40 TABLET ORAL 2 TIMES DAILY
Status: DISCONTINUED | OUTPATIENT
Start: 2022-06-20 | End: 2022-06-22 | Stop reason: HOSPADM

## 2022-06-20 RX ADMIN — FUROSEMIDE 40 MG: 40 TABLET ORAL at 18:34

## 2022-06-20 RX ADMIN — FLUOXETINE 20 MG: 20 CAPSULE ORAL at 09:36

## 2022-06-20 RX ADMIN — CARVEDILOL 6.25 MG: 6.25 TABLET, FILM COATED ORAL at 18:34

## 2022-06-20 RX ADMIN — FUROSEMIDE 40 MG: 40 TABLET ORAL at 12:49

## 2022-06-20 RX ADMIN — FUROSEMIDE 40 MG: 10 INJECTION, SOLUTION INTRAMUSCULAR; INTRAVENOUS at 09:36

## 2022-06-20 RX ADMIN — WARFARIN SODIUM 5 MG: 5 TABLET ORAL at 18:34

## 2022-06-20 RX ADMIN — SODIUM CHLORIDE, PRESERVATIVE FREE 10 ML: 5 INJECTION INTRAVENOUS at 23:01

## 2022-06-20 RX ADMIN — SODIUM CHLORIDE, PRESERVATIVE FREE 10 ML: 5 INJECTION INTRAVENOUS at 05:16

## 2022-06-20 RX ADMIN — CARVEDILOL 6.25 MG: 6.25 TABLET, FILM COATED ORAL at 09:36

## 2022-06-20 RX ADMIN — SODIUM CHLORIDE, PRESERVATIVE FREE 10 ML: 5 INJECTION INTRAVENOUS at 14:25

## 2022-06-20 RX ADMIN — PANTOPRAZOLE SODIUM 40 MG: 40 TABLET, DELAYED RELEASE ORAL at 09:35

## 2022-06-20 NOTE — PROGRESS NOTES
Warfarin Dosing - Pharmacy Consult Note  Consulting Provider: EULALIA Michel  Indication: History of VTE/PE and Atrial Fibrillation  Warfarin Dose prior to admission: 30 mg/week (5 mg po daily except Monday and Friday, he takes 2.5 mg)  Anticoag visits from [facility] in [facility]   Concurrent anticoagulants/antiplatelets: none  Significant Drug Interactions: No obvious interactions  Recent Labs     06/20/22  0256 06/19/22  0135 06/18/22  0046   INR 2.1* 2.0* 1.8*       Date      INR       Dose  6/19        2.0        5 mg  6/18        1.8        5 mg  6/17        1.7        5 mg  6/16        1.8        5 mg  6/15         2.1       5 mg  6/20         2.1       5mg      Assessment/Plan  Goal INR: 2 - 3  5 mg dose Warfarin for 6/20  Active problem list reviewed. INR orders are placed. Chart reviewed for pertinent labs, drug/diet interactions, and past doses. Documentation of patient's clinical condition was reviewed. Pharmacy Dosing:  Pharmacy will continue to follow.

## 2022-06-20 NOTE — PROGRESS NOTES
[facility] - Progress Note  Admit Date: 6/14/2022    Assessment:     -Admitted for scrotal edema and concern with fluid overload.  Albumin is 2.4, hypoalbuminemia contributing factor as well  -Acute diastolic HFpEF.  Echo 3/91/98 with EF 55%, although pro-BNP only 100 at presentation. Now on maintenance lasix. · NST 1/2019: negative for ischemia/infarct. EF 78%. · Mild to moderate MR based on echo in 2021  -Paroxysmal atrial fibrillation. On chronic warfarin.  -Hypertension  -Tremor (debillitating). -Sleep apnea. -H/o ETOH abuse (stopped 8/2019). -Hx of cocaine, marijuana in the 90's. -Morbid obesity  -Current major depression      Primary cardiologist : Dr.John Sumeet Rodriguez    Plan: Tolerating oral lasix. Dispo per primary team, will followup Dr. Ad Connolly at [facility], primary cardiologist.    Subjective:     Breathing better.     Objective:      Patient Vitals for the past 8 hrs:   Temp Pulse Resp BP SpO2   06/20/22 0825 98.9 °F (37.2 °C) 81 20 (!) 126/57 91 %   06/20/22 0400 98.3 °F (36.8 °C) 71 20 (!) 100/59 93 %         Patient Vitals for the past 96 hrs:   Weight   06/19/22 1754 156 kg (344 lb)   06/16/22 2220 156.4 kg (344 lb 14.4 oz)                    Intake/Output Summary (Last 24 hours) at 6/20/2022 1112  Last data filed at 6/20/2022 0941  Gross per 24 hour   Intake 600 ml   Output 1600 ml   Net -1000 ml       Physical Exam:  General:  alert, cooperative, no distress, appears stated age  Neck:  nontender  Lungs:  clear to auscultation bilaterally  Heart:  regular rate and rhythm, S1, S2 normal, no murmur, click, rub or gallop  Abdomen:  abdomen is soft without significant tenderness, masses, organomegaly or guarding  Extremities:  extremities normal, atraumatic, no cyanosis or edema    Data Review:     Labs: Results:       Chemistry Recent Labs     06/20/22  0256 06/19/22  0135 06/18/22  0046   GLU 90 90 94    140 140   K 3.5 3.5 3.4*    105 104   CO2 32 31 32   BUN 15 14 15   CREA 0.93 0.81 0.79   CA 9.1 9.0 9.1   AGAP 5 4 4   BUCR 16 17 19      CBC w/Diff No results for input(s): WBC, RBC, HGB, HCT, PLT, GRANS, LYMPH, EOS, HGBEXT, HCTEXT, PLTEXT in the last 72 hours. Cardiac Enzymes No results found for: CPK, CK, CKMMB, CKMB, RCK3, CKMBT, CKNDX, CKND1, MADELIN, TROPT, TROIQ, SAUD, TROPT, TNIPOC, BNP, BNPP   Coagulation Recent Labs     06/20/22  0256 06/19/22  0135   PTP 23.6* 22.8*   INR 2.1* 2.0*       Lipid Panel No results found for: CHOL, CHOLPOCT, CHOLX, CHLST, CHOLV, 000585, HDL, HDLP, LDL, LDLC, DLDLP, 436054, VLDLC, VLDL, TGLX, TRIGL, TRIGP, TGLPOCT, CHHD, CHHDX   BNP No results found for: BNP, BNPP, XBNPT   Liver Enzymes No results for input(s): TP, ALB, TBIL, AP in the last 72 hours.     No lab exists for component: SGOT, GPT, DBIL   Digoxin    Thyroid Studies No results found for: T4, T3U, TSH, TSHEXT       Signed By: Perla Steiner MD     June 20, 2022

## 2022-06-20 NOTE — PROGRESS NOTES
Wound Prevention Checklist    Patient: Brittnee Pabon (20 y.o. male)  Date: 6/20/2022  Diagnosis: Fluid overload [E87.70]  Scrotal edema [N50.89]  Bilateral lower extremity pain [M79.604, M79.605] Acute on chronic diastolic heart failure (HCC)    Precautions: Skin,Fall       []  Heel prevention boots placed on patient    [x]  Patient turned q2h during shift    []  Lift team ordered    [x]  Patient on [place] bed/Specialty bed    [x]  Each Wound is documented during shift (Stage, Color, drainage, odor, measurements, and dressings)    [x]  Dual skin checks done at bedside during shift report with Sadie Mckeon RN

## 2022-06-20 NOTE — PROGRESS NOTES
Daughter is concerned - she doesn't want pt to go to the Group home - she feels he is not medically stable & his appetite is poor. CXR ordered.   PT & OT eval

## 2022-06-20 NOTE — PROGRESS NOTES
[facility] Hospitalist Group  Progress Note    Patient: Sweta Muller Age: 67 y.o. : 1950 MR#: [id number] SSN: [id number]  Date: 2022         Assessment/Plan:     Hospital Problems  Never Reviewed          Codes Class Noted POA    Hypokalemia ICD-10-CM: E87.6  ICD-9-CM: 276.8  6/15/2022 No        Scrotal edema ICD-10-CM: N50.89  ICD-9-CM: 608.86  2022 Unknown        * (Principal) Acute on chronic diastolic heart failure (HCC) ICD-10-CM: I50.33  ICD-9-CM: 428.33  2022 Yes        Bilateral lower extremity pain ICD-10-CM: M79.604, M79.605  ICD-9-CM: 729.5  2022 Unknown        Paroxysmal atrial fibrillation (HCC) ICD-10-CM: I48.0  ICD-9-CM: 427.31  2020 Yes            Acute on chronic HFpEF  - continue diuresis with p.o. Lasix. - monitor BMP - renal function is stable  - strict I&Os, daily weight  - echo noted  - appreciate cardiology assistance    Scrotal edema: 2/2 HF. Pain and swelling improving with diuresis. No s/sx of infection    PAF  -.cont coreg, warfarin  - daily INRs    Hypokalemia  - replace as needed and monitor    Morbid Obesity   Counseled on weight loss     Chest x-ray-no evidence of pulmonary disease. Patient was scheduled to be discharged today however daughter came in and mentions that she feels that the patient is weak and not ready to be discharged to group home. PT and OT ordered, awaiting input. Continue current treatment plan. DVT Prophylaxis:  []Lovenox  []Hep SQ  []SCDs  [x]Coumadin   []On Heparin gtt []PO anticoagulant    Anticipated discharge: 1-2 days; PT recs SNF    Subjective:     Pt seen and evaluated, lying in bed, no acute distress.     Objective:   VS:   Visit Vitals  /68   Pulse 77   Temp 99.7 °F (37.6 °C)   Resp 20   Ht 6' (1.829 m)   Wt 156 kg (344 lb)   SpO2 91%   BMI 46.65 kg/m²      Tmax/24hrs: Temp (24hrs), Av.8 °F (37.1 °C), Min:98.3 °F (36.8 °C), Max:99.7 °F (37.6 °C)      Intake/Output Summary (Last 24 hours) at 6/20/2022 1718  Last data filed at 6/20/2022 0941  Gross per 24 hour   Intake 240 ml   Output 550 ml   Net -310 ml       General Appearance: NAD, conversant  HENT: normocephalic/atraumatic, moist mucus membranes  Neck: No JVD, supple  Lungs: CTA with normal respiratory effort  CV: RRR, no m/r/g  Abdomen: soft, non-tender, normal bowel sounds  Extremities: no cyanosis, 2+ pitting edema BLE  Neuro: No focal deficits, motor/sensory intact  Skin: Normal color, intact  Psych: appropriate affect    Current Facility-Administered Medications   Medication Dose Route Frequency    furosemide (LASIX) tablet 40 mg  40 mg Oral BID    warfarin (COUMADIN) tablet 5 mg  5 mg Oral QPM    WARFARIN,  Pharmacy Dosing   Other Q24H    sodium chloride (NS) flush 5-40 mL  5-40 mL IntraVENous Q8H    sodium chloride (NS) flush 5-40 mL  5-40 mL IntraVENous PRN    acetaminophen (TYLENOL) tablet 650 mg  650 mg Oral Q6H PRN    Or    acetaminophen (TYLENOL) suppository 650 mg  650 mg Rectal Q6H PRN    polyethylene glycol (MIRALAX) packet 17 g  17 g Oral DAILY PRN    ondansetron (ZOFRAN ODT) tablet 4 mg  4 mg Oral Q8H PRN    Or    ondansetron (ZOFRAN) injection 4 mg  4 mg IntraVENous Q6H PRN    carvediloL (COREG) tablet 6.25 mg  6.25 mg Oral BID    FLUoxetine (PROzac) capsule 20 mg  20 mg Oral DAILY    pantoprazole (PROTONIX) tablet 40 mg  40 mg Oral ACB        Labs:    Recent Results (from the past 24 hour(s))   PROTHROMBIN TIME + INR    Collection Time: 06/20/22  2:56 AM   Result Value Ref Range    Prothrombin time 23.6 (H) 11.5 - 15.2 sec    INR 2.1 (H) 0.8 - 1.2     METABOLIC PANEL, BASIC    Collection Time: 06/20/22  2:56 AM   Result Value Ref Range    Sodium 139 136 - 145 mmol/L    Potassium 3.5 3.5 - 5.5 mmol/L    Chloride 102 100 - 111 mmol/L    CO2 32 21 - 32 mmol/L    Anion gap 5 3.0 - 18 mmol/L    Glucose 90 74 - 99 mg/dL    BUN 15 7.0 - 18 MG/DL    Creatinine 0.93 0.6 - 1.3 MG/DL    BUN/Creatinine ratio 16 12 - 20      GFR est AA >60 >60 ml/min/1.73m2    GFR est non-AA >60 >60 ml/min/1.73m2    Calcium 9.1 8.5 - 10.1 MG/DL       Imaging:  XR CHEST PORT    Result Date: 6/20/2022  Portable CXR: Comparison June 14, 2022 HISTORY: Shortness of breath. Stable cardiomegaly. No vascular congestion. No consolidation. No pleural effusion.      No acute pulmonary disease         Selena Hardy MD    June 20, 2022

## 2022-06-20 NOTE — PROGRESS NOTES
Problem: Falls - Risk of  Goal: *Absence of Falls  Description: Document Elishalul Dillon Fall Risk and appropriate interventions in the flowsheet. Outcome: Progressing Towards Goal  Note: Fall Risk Interventions:  Mobility Interventions: Assess mobility with egress test,Patient to call before getting OOB         Medication Interventions: Evaluate medications/consider consulting pharmacy,Patient to call before getting OOB,Teach patient to arise slowly    Elimination Interventions: Bed/chair exit alarm,Call light in reach,Patient to call for help with toileting needs    History of Falls Interventions: Bed/chair exit alarm,Consult care management for discharge planning         Problem: Patient Education: Go to Patient Education Activity  Goal: Patient/Family Education  Outcome: Progressing Towards Goal     Problem: Pain  Goal: *Control of Pain  Outcome: Progressing Towards Goal  Goal: *PALLIATIVE CARE:  Alleviation of Pain  Outcome: Progressing Towards Goal     Problem: Patient Education: Go to Patient Education Activity  Goal: Patient/Family Education  Outcome: Progressing Towards Goal     Problem: Patient Education: Go to Patient Education Activity  Goal: Patient/Family Education  Outcome: Progressing Towards Goal     Problem: Patient Education: Go to Patient Education Activity  Goal: Patient/Family Education  Outcome: Progressing Towards Goal     Problem: Pressure Injury - Risk of  Goal: *Prevention of pressure injury  Description: Document Jean Scale and appropriate interventions in the flowsheet.   Outcome: Progressing Towards Goal  Note: Pressure Injury Interventions:  Sensory Interventions: Assess changes in LOC    Moisture Interventions: Absorbent underpads,Maintain skin hydration (lotion/cream)    Activity Interventions: Increase time out of bed,Pressure redistribution bed/mattress(bed type)    Mobility Interventions: Assess need for specialty bed,HOB 30 degrees or less    Nutrition Interventions: Document food/fluid/supplement intake    Friction and Shear Interventions: Apply protective barrier, creams and emollients,HOB 30 degrees or less                Problem: Patient Education: Go to Patient Education Activity  Goal: Patient/Family Education  Outcome: Progressing Towards Goal

## 2022-06-20 NOTE — PROGRESS NOTES
Warfarin Dosing - Pharmacy Consult Note  Consulting Provider: EULALIA Michel  Indication: History of VTE/PE and Atrial Fibrillation  Warfarin Dose prior to admission: 30 mg/week (5 mg po daily except Monday and Friday, he takes 2.5 mg)  Anticoag visits from [facility] in [address] [place] [address] Loop   Concurrent anticoagulants/antiplatelets: none  Significant Drug Interactions: No obvious interactions  Recent Labs     06/20/22  0256 06/19/22  0135 06/18/22  0046   INR 2.1* 2.0* 1.8*       Date      INR       Dose  6/19        2.0        5 mg  6/18        1.8        5 mg  6/17        1.7        5 mg  6/16        1.8        5 mg  6/15         2.1       5 mg  6/20         2.1       5mg      Assessment/Plan  Goal INR: 2 - 3  5 mg dose Warfarin for 6/20  Active problem list reviewed. INR orders are placed. Chart reviewed for pertinent labs, drug/diet interactions, and past doses. Documentation of patient's clinical condition was reviewed. Pharmacy Dosing:  Pharmacy will continue to follow.

## 2022-06-20 NOTE — PROGRESS NOTES
Dischharge planning    Met with patient at bedside to discuss discharge to return to group home. Per patient, he has an personal care aide 12 hours a day at group home. Owner's name is Johnie Ashby. Patient request that  call daughter, Leon Beard, for Ms. Matthews's contact information. [address], daughter, for Ms. Matthews's contact information. Per Leon Beard, contact number is [phone]. Also,[facility] can provide transportation back to group [place] today if the group home's staff is unable to accommodate. Called Johnie Ashby, group home owner, left a voice message. Waiting for a return call.      AGATHA AdhikariN, RN  Pager # [phone]  Care Manager

## 2022-06-21 VITALS
BODY MASS INDEX: 42.66 KG/M2 | HEART RATE: 71 BPM | RESPIRATION RATE: 20 BRPM | OXYGEN SATURATION: 93 % | SYSTOLIC BLOOD PRESSURE: 143 MMHG | WEIGHT: 315 LBS | TEMPERATURE: 98.3 F | HEIGHT: 72 IN | DIASTOLIC BLOOD PRESSURE: 68 MMHG

## 2022-06-21 LAB
ANION GAP SERPL CALC-SCNC: 5 MMOL/L (ref 3–18)
BUN SERPL-MCNC: 15 MG/DL (ref 7–18)
BUN/CREAT SERPL: 16 (ref 12–20)
CALCIUM SERPL-MCNC: 8.9 MG/DL (ref 8.5–10.1)
CHLORIDE SERPL-SCNC: 100 MMOL/L (ref 100–111)
CO2 SERPL-SCNC: 32 MMOL/L (ref 21–32)
CREAT SERPL-MCNC: 0.92 MG/DL (ref 0.6–1.3)
GLUCOSE SERPL-MCNC: 97 MG/DL (ref 74–99)
INR PPP: 2.1 (ref 0.8–1.2)
POTASSIUM SERPL-SCNC: 3.3 MMOL/L (ref 3.5–5.5)
PROTHROMBIN TIME: 23.5 SEC (ref 11.5–15.2)
SODIUM SERPL-SCNC: 137 MMOL/L (ref 136–145)

## 2022-06-21 PROCEDURE — 80048 BASIC METABOLIC PNL TOTAL CA: CPT

## 2022-06-21 PROCEDURE — 99239 HOSP IP/OBS DSCHRG MGMT >30: CPT | Performed by: HOSPITALIST

## 2022-06-21 PROCEDURE — 97110 THERAPEUTIC EXERCISES: CPT

## 2022-06-21 PROCEDURE — 36415 COLL VENOUS BLD VENIPUNCTURE: CPT

## 2022-06-21 PROCEDURE — 74011000250 HC RX REV CODE- 250: Performed by: NURSE PRACTITIONER

## 2022-06-21 PROCEDURE — 85610 PROTHROMBIN TIME: CPT

## 2022-06-21 PROCEDURE — 74011250637 HC RX REV CODE- 250/637: Performed by: NURSE PRACTITIONER

## 2022-06-21 PROCEDURE — 74011250637 HC RX REV CODE- 250/637: Performed by: HOSPITALIST

## 2022-06-21 PROCEDURE — 97164 PT RE-EVAL EST PLAN CARE: CPT

## 2022-06-21 PROCEDURE — 65270000046 HC RM TELEMETRY

## 2022-06-21 RX ORDER — WARFARIN SODIUM 5 MG/1
5 TABLET ORAL EVERY EVENING
Status: COMPLETED | OUTPATIENT
Start: 2022-06-21 | End: 2022-06-21

## 2022-06-21 RX ADMIN — FUROSEMIDE 40 MG: 40 TABLET ORAL at 17:28

## 2022-06-21 RX ADMIN — CARVEDILOL 6.25 MG: 6.25 TABLET, FILM COATED ORAL at 17:28

## 2022-06-21 RX ADMIN — FUROSEMIDE 40 MG: 40 TABLET ORAL at 09:08

## 2022-06-21 RX ADMIN — PANTOPRAZOLE SODIUM 40 MG: 40 TABLET, DELAYED RELEASE ORAL at 09:08

## 2022-06-21 RX ADMIN — SODIUM CHLORIDE, PRESERVATIVE FREE 10 ML: 5 INJECTION INTRAVENOUS at 13:31

## 2022-06-21 RX ADMIN — WARFARIN SODIUM 5 MG: 5 TABLET ORAL at 17:27

## 2022-06-21 RX ADMIN — SODIUM CHLORIDE, PRESERVATIVE FREE 10 ML: 5 INJECTION INTRAVENOUS at 09:09

## 2022-06-21 RX ADMIN — CARVEDILOL 6.25 MG: 6.25 TABLET, FILM COATED ORAL at 09:08

## 2022-06-21 RX ADMIN — FLUOXETINE 20 MG: 20 CAPSULE ORAL at 09:07

## 2022-06-21 NOTE — DISCHARGE INSTRUCTIONS
DISCHARGE SUMMARY from Nurse    PATIENT INSTRUCTIONS:    After general anesthesia or intravenous sedation, for 24 hours or while taking prescription Narcotics:  · Limit your activities  · Do not drive and operate hazardous machinery  · Do not make important personal or business decisions  · Do  not drink alcoholic beverages  · If you have not urinated within 8 hours after discharge, please contact your surgeon on call. Report the following to your surgeon:  · Excessive pain, swelling, redness or odor of or around the surgical area  · Temperature over 100.5  · Nausea and vomiting lasting longer than 4 hours or if unable to take medications  · Any signs of decreased circulation or nerve impairment to extremity: change in color, persistent  numbness, tingling, coldness or increase pain  · Any questions    What to do at Home:  Recommended activity: Activity as tolerated, ***    If you experience any of the following symptoms chest pain, shortness of breath, fever greater than 100.5, nausea vomiting, pain unrelieved by medication,, please follow up with Abel Lewis MD.    *  Please give a list of your current medications to your Primary Care Provider. *  Please update this list whenever your medications are discontinued, doses are      changed, or new medications (including over-the-counter products) are added. *  Please carry medication information at all times in case of emergency situations. These are general instructions for a healthy lifestyle:    No smoking/ No tobacco products/ Avoid exposure to second hand smoke  Surgeon General's Warning:  Quitting smoking now greatly reduces serious risk to your health.     Obesity, smoking, and sedentary lifestyle greatly increases your risk for illness    A healthy diet, regular physical exercise & weight monitoring are important for maintaining a healthy lifestyle    You may be retaining fluid if you have a history of heart failure or if you experience any of the following symptoms:  Weight gain of 3 pounds or more overnight or 5 pounds in a week, increased swelling in our hands or feet or shortness of breath while lying flat in bed. Please call your doctor as soon as you notice any of these symptoms; do not wait until your next office visit. Patient armband removed and shredded  MyChart Activation    Thank you for requesting access to [employer]. Please follow the instructions below to securely access and download your online medical record. [employer] allows you to send messages to your doctor, view your test results, renew your prescriptions, schedule appointments, and more. How Do I Sign Up? 1. In your internet browser, go to [email or web address]  2. Click on the First Time User? Click Here link in the Sign In box. You will be redirect to the New Member Sign Up page. 3. Enter your [employer] Access Code exactly as it appears below. You will not need to use this code after youve completed the sign-up process. If you do not sign up before the expiration date, you must request a new code. [employer] Access Code: [email or web address]  Expires: 2022  1:53 PM (This is the date your [employer] access code will )    4. Enter the last four digits of your Social Security Number (xxxx) and Date of Birth (mm/dd/yyyy) as indicated and click Submit. You will be taken to the next sign-up page. 5. Create a [employer] ID. This will be your [employer] login ID and cannot be changed, so think of one that is secure and easy to remember. 6. Create a [employer] password. You can change your password at any time. 7. Enter your Password Reset Question and Answer. This can be used at a later time if you forget your password. 8. Enter your e-mail address. You will receive e-mail notification when new information is available in [address] 9. Click Sign Up. You can now view and download portions of your medical record.   10. Click the Download Summary menu link to download a portable copy of your medical information. Additional Information    If you have questions, please visit the Frequently Asked Questions section of the [employer] website at [email or web address] Remember, [employer] is NOT to be used for urgent needs. For medical emergencies, dial 911. The discharge information has been reviewed with the {PATIENT PARENT GUARDIAN:75498}. The {PATIENT PARENT GUARDIAN:62008} verbalized understanding. Discharge medications reviewed with the {D[employer]ge meds reviewed ANXU:73751} and appropriate educational materials and side effects teaching were provided.   ___________________________________________________________________________________________________________________________________

## 2022-06-21 NOTE — PROGRESS NOTES
1937hrs:  Bedside shift change report given to MARV Mccoy (oncoming nurse) by Vijaya Patel RN (offgoing nurse). Report included the following information SBAR, Kardex, Intake/Output, MAR and Cardiac Rhythm NSR.     2254hrs:  Pt requested and assisted with iced water. 0640hrs:  Pt's body and devon areas cleaned with bath packs; bed pan and linen changed. Pt requested and assisted with iced water. 0745hrs:  Bedside shift change report given to MARV Noel & Student Nurse (oncoming nurse) by Sterling Domingo RN (offgoing nurse). Report included the following information SBAR, Kardex, Intake/Output, MAR, Recent Results and Cardiac Rhythm NSR. Wound Prevention Checklist    Patient: Ev Sullivan (15 y.o. male)  Date: 6/21/2022  Diagnosis: Fluid overload [E87.70]  Scrotal edema [N50.89]  Bilateral lower extremity pain [M79.604, M79.605] Acute on chronic diastolic heart failure (HCC)    Precautions: Skin,Fall       []  Heel prevention boots placed on patient    [x]  Patient turned q2h during shift    []  Lift team ordered    []  Patient on [place] bed/Specialty bed    []  Each Wound is documented during shift (Stage, Color, drainage, odor, measurements, and dressings)    [x]  Dual skin checks done at bedside during shift report with MARV Noel. Pt with redness and possible (1) small hemorrhoid to buttock area. Will continue to monitor.     Jorge Shea RN

## 2022-06-21 NOTE — ROUTINE PROCESS
Bedside shift change report given to AMRV Mccoy (oncoming nurse) by Alejandra Santizo RN (offgoing nurse). Report included the following information SBAR, Kardex, Intake/Output and MAR.

## 2022-06-21 NOTE — ROUTINE PROCESS
New OT order received and chart reviewed. First attempt: pt eating lunch at 13:10, second attempt at 13:59 discussing with pt acute OT services and [facility] OT services to discuss if pt would benefit from OT. Determined pt is concerned about [facility] transfer >7 pm when caregiver leaves for the evening. Pt would like to work on [facility] transfer, however, at this time pt declines OT evaluation due to fatigue. Pt noted to have dc put in for tomorrow back to group home. Will follow up with pt in the am as pt's schedule allows and case load allows.  Pt would benefit from [facility] for safety with toilet transfers after 7 pm.     Thank you, Robb Madrigal

## 2022-06-21 NOTE — PROGRESS NOTES
Wound Prevention Checklist    Patient: Chivo Petty (86 y.o. male)  Date: 6/21/2022  Diagnosis: Fluid overload [E87.70]  Scrotal edema [N50.89]  Bilateral lower extremity pain [M79.604, M79.605] Acute on chronic diastolic heart failure (HCC)    Precautions: Fall       [x]  Heel prevention boots placed on patient    [x]  Patient turned q2h during shift    []  Lift team ordered    []  Patient on [place] bed/Specialty bed    []  Each Wound is documented during shift (Stage, Color, drainage, odor, measurements, and dressings)    [x]  Dual skin checks done at bedside during shift report with MARV Myers    Noted redness  On the sacrum.         Sadie Scott RN

## 2022-06-21 NOTE — DISCHARGE SUMMARY
Hospitalist Discharge Summary    Patient: Hugo Mcmahon MRN: [id number]  CSN: [id number]    YOB: 1950  Age: 67 y.o. Sex: male    DOA: 6/14/2022 LOS:  LOS: 7 days   Discharge Date:     Admission Diagnoses: Fluid overload [E87.70]  Scrotal edema [N50.89]  Bilateral lower extremity pain [M79.604, M79.605]    Discharge Diagnoses:    1. Acute on chronic diastolic CHF exacerbation  2. Scrotal edema secondary to #1  3. Morbid obesity  4. H/o tremors,? Parkinson's disease  5. History of paroxysmal atrial fibrillation      Discharge Condition: Fair    Discharge To: Home    CODE STATUS: Full Code      PHYSICAL EXAM  Visit Vitals  BP (!) 142/70 (BP 1 Location: Right lower arm, BP Patient Position: Semi fowlers; Lying)   Pulse 78   Temp 97.5 °F (36.4 °C)   Resp 20   Ht 6' (1.829 m)   Wt 154.9 kg (341 lb 8 oz)   SpO2 93%   BMI 46.32 kg/m²       General: Alert, cooperative, no acute distress    Lungs:  CTA Bilaterally. No Wheezing/Rhonchi/Rales. Heart:  Regular rate and Rhythm. Abdomen: Soft, Non distended, Non tender. + Bowel sounds. Extremities: Bilateral trace edema, scrotal edema  Neurologic:  AA oriented X 3. Moves all extremities. Rossana Muñiz is a 67 y.o.  male with PMH of diastolic CHF, hypertension, severe obesity, essential tremor, paroxysmal A. fib, history of alcohol abuse, recurrent major depression, who presents with scrotal edema present for the last 7 days. Patient states he has been compliant with his Lasix at home however despite this scrotal swelling progressed and got \"real bad\" starting on Friday and progressively worse over the weekend. Patient states he has been living in a group home, normally ambulatory however over the past 1 week he has been having difficulty ambulating due to severity of scrotal swelling. He states he has been able to void however he has needed to sit down to urinate and overall difficult to position himself.   Patient reports he chronically gets dyspnea on exertion and he has not noticed this to worsen, denies shortness of breath at rest he denies chest pain at rest or with ambulation, denies palpitations. He he is unsure if his dry weight but states he has been trying to lose weight over the last several months. Patient denies blood in urine or stools, states he is compliant with his medications including his Coumadin. Hospital Course:     Patient admitted to the hospital secondary to shortness of breath. Patient has a known history of chronic diastolic CHF, morbid obesity, essential tremors, paroxysmal A. fib, hypertension. Patient seen by cardiology, diuresed with IV Lasix with improvement of his edema. Patient has a known history of atrial fibrillation, rate controlled, [address] with Coumadin, pharmacy to dose and patient was noted to be therapeutic. Patient seen by PT and OT and recommending rehab however he stays at a group home and wishes to be discharged today. Patient is currently being discharged with home health and DME equipment. He is advised to closely follow-up with his PCP and cardiology in 2 weeks. He is advised to be compliant with his diet as well as salt restriction. Patient verbalized understanding. Discharge plan was discussed with patient's daughter who verbalized understanding. Follow up Care: With PCP and cardiology in 2 weeks    Consults: Cardiology    Significant Diagnostic Studies:     Imaging:  XR Results (most recent):  Results from Hospital Encounter encounter on 06/14/22    XR CHEST PORT    Narrative  Portable CXR:    Comparison June 14, 2022    HISTORY: Shortness of breath. Stable cardiomegaly. No vascular congestion. No consolidation. No pleural  effusion.     Impression  No acute pulmonary disease       CT Results (most recent):  No results found for this or any previous visit.      06/14/22    ECHO ADULT FOLLOW-UP OR LIMITED 06/15/2022 6/15/2022    Interpretation Summary    Contrast used: Definity.   Technical qualifiers: Echo study was technically difficult due to patient's body habitus.   Left Ventricle: Normal left ventricular systolic function with a visually estimated EF of 55 - 60%. Not well visualized. Unable to assess wall thickness. Normal wall motion.   Right Ventricle: Not assessed due to poor image quality.   Pericardium: There is evidence of epicardial fat. Trivial pericardial effusion present. No indication of cardiac tamponade. Signed by: Nathan Nunes MD on 6/15/2022  1:59 PM       MRI Results (most recent):  No results found for this or any previous visit. Procedures:     None       Current Discharge Medication List      CONTINUE these medications which have NOT CHANGED    Details   carvediloL (COREG) 6.25 mg tablet Take 6.25 mg by mouth two (2) times a day. FLUoxetine (PROzac) 20 mg capsule Take 20 mg by mouth daily. omeprazole (PRILOSEC) 20 mg capsule Take 20 mg by mouth daily. potassium chloride SR (K-TAB) 20 mEq tablet Take 20 mEq by mouth daily. warfarin (COUMADIN) 5 mg tablet Take 1 tablet daily as directed by [facility].      compr.stocking,knee,long,large (COMP STOCKING,KNEE,LONG,LARGE) misc 1 Package by Does Not Apply route daily. Qty: 3 Package, Refills: 0         STOP taking these medications       furosemide (LASIX) 40 mg tablet Comments:   Reason for Stopping:                 Activity: Activity as tolerated    Diet: Cardiac Diet    Wound Care: As directed      Gerardo Coppola MD  6/21/2022, 1:44 PM    Total time spent 35 mins  Disclaimer: Sections of this note are dictated using utilizing voice recognition software. Minor typographical errors may be present. If questions arise, please do not hesitate to contact me or call our department.

## 2022-06-21 NOTE — PROGRESS NOTES
Problem: Falls - Risk of  Goal: *Absence of Falls  Description: Document [place] Fall Risk and appropriate interventions in the flowsheet. Outcome: Progressing Towards Goal  Note: Fall Risk Interventions:  Mobility Interventions: Assess mobility with egress test,Patient to call before getting OOB         Medication Interventions: Evaluate medications/consider consulting pharmacy,Patient to call before getting OOB,Teach patient to arise slowly    Elimination Interventions: Bed/chair exit alarm,Call light in reach,Patient to call for help with toileting needs    History of Falls Interventions: Bed/chair exit alarm,Consult care management for discharge planning         Problem: Patient Education: Go to Patient Education Activity  Goal: Patient/Family Education  Outcome: Progressing Towards Goal     Problem: Pain  Goal: *Control of Pain  Outcome: Progressing Towards Goal  Goal: *PALLIATIVE CARE:  Alleviation of Pain  Outcome: Progressing Towards Goal     Problem: Patient Education: Go to Patient Education Activity  Goal: Patient/Family Education  Outcome: Progressing Towards Goal     Problem: Patient Education: Go to Patient Education Activity  Goal: Patient/Family Education  Outcome: Progressing Towards Goal     Problem: Patient Education: Go to Patient Education Activity  Goal: Patient/Family Education  Outcome: Progressing Towards Goal     Problem: Pressure Injury - Risk of  Goal: *Prevention of pressure injury  Description: Document Jean Scale and appropriate interventions in the flowsheet.   Outcome: Progressing Towards Goal  Note: Pressure Injury Interventions:  Sensory Interventions: Assess changes in LOC,Maintain/enhance activity level    Moisture Interventions: Absorbent underpads,Maintain skin hydration (lotion/cream)    Activity Interventions: Assess need for specialty bed,Increase time out of bed,Pressure redistribution bed/mattress(bed type)    Mobility Interventions: Assess need for specialty bed,HOB 30 degrees or less    Nutrition Interventions: Document food/fluid/supplement intake    Friction and Shear Interventions: Apply protective barrier, creams and emollients,HOB 30 degrees or less                Problem: Patient Education: Go to Patient Education Activity  Goal: Patient/Family Education  Outcome: Progressing Towards Goal

## 2022-06-21 NOTE — PROGRESS NOTES
Problem: Mobility Impaired (Adult and Pediatric)  Goal: *Acute Goals and Plan of Care (Insert Text)  Description: Physical Therapy Goals  Re-eval 06/21/22 goals remain appropriate  1. Patient will move from supine to sit and sit to supine , scoot up and down, and roll side to side in bed with modified independence. 2.  Patient will transfer from bed to chair and chair to bed with modified independence using the least restrictive device. 3.  Patient will perform sit to stand with modified independence. 4.  Patient will ambulate with modified independence for 50 feet with the least restrictive device. 5.  Patient will ascend/descend 4 stairs with R handrail(s) with supervision/set-up. PLOF: Pt reporting independent with mobility in 2 story group home with room on first floor. Pt reports having 4 DB with R handrail. Pt reports needing assist for feeding due to tremors and gets assist with bathing with shower chair. 6/21/2022 1141 by KATERYNA Cruz  Outcome: Progressing Towards Goal     PHYSICAL THERAPY RE-EVALUATION    Patient: Garo Olivares (59 y.o. male)  Date: 6/21/2022  Primary Diagnosis: Fluid overload [E87.70]  Scrotal edema [N50.89]  Bilateral lower extremity pain [M79.604, M79.605]  Precautions: Fall  ASSESSMENT :  Goals reviewed and remain appropriate at the time of re-eval. Pt reclined in bed, NAD, and agreeable to PT. Pt able to transfer to EOB with CGA, requiring extra time. Observed BUE tremors (L > R) affecting functional mobility. Pt use momentum and additional time to perform STS with CGA, demonstrated impaired standing balance. Requiring modA to transfer from EOB to recliner amb 3 feet without AD, due to posterior leaning posture and imbalance. Poor eccentric control upon sitting. STS x1 with CGA to RW; demonstrated fair balance with BUE support. Returned to sitting in recliner for seated exercises with demonstration and verbal cues.  Educated on importance of doing exercises every hour to maintain strength, verbalized understanding. Educated pt on scrotal sling for when getting back in bed to reduce friction and have better control over area. Pt left seated in recliner, all needs met and call bell in reach. Educated on need for RN assistance with mobility; verbalized understanding. Patient will continue to benefit from skilled intervention to address the above impairments. PLAN :  Recommendations and Planned Interventions:  [x]           Bed Mobility Training             [x]    Neuromuscular Re-Education  [x]           Transfer Training                   []    Orthotic/Prosthetic Training  [x]           Gait Training                          []    Modalities  [x]           Therapeutic Exercises           []    Edema Management/Control  [x]           Therapeutic Activities            [x]    Family Training/Education  [x]           Patient Education  []           Other (comment):    Frequency/Duration: Patient will continue to be followed by physical therapy 3-5 times a week  to address goals. Discharge Recommendations: Rehab  Further Equipment Recommendations for Discharge: rolling walker     SUBJECTIVE:   Patient stated Daria Aquino haven't been out of bed in a couple days.     OBJECTIVE DATA SUMMARY:     Past Medical History:   Diagnosis Date    [facility]     Bronchitis     Cardiomyopathy [facility]     [facility]     DVT (deep venous thrombosis) ([facility])     Essential tremor     Exposure to [employer] GERD (gastroesophageal reflux disease)     History of pulmonary embolus (PE)     HTN (hypertension)     Major depressive disorder     Obesity     Parkinson's disease ([facility]     PVD (peripheral vascular disease) ([facility]     Vitamin D deficiency      Past Surgical History:   Procedure Laterality Date    NEUROLOGICAL PROCEDURE UNLISTED      Neurostimulator to brain.         Critical Behavior:  Neurologic State: Alert  Orientation Level: Oriented X4  Cognition: Follows commands     Psychosocial  Patient Behaviors: Calm; Cooperative  Needs Expressed: Educational  Purposeful Interaction: Yes  Pt Identified Daily Priority: Clinical issues (comment)  Caritas Process: Nurture loving kindness;Enable karly/hope;Establish trust;Nurture spiritual self;Teaching/learning; Attend basic human needs;Create healing environment;Supportive expression  Caring Interventions: Reassure; Therapeutic modalities  Reassure: Therapeutic listening; Informing; Instilling karly and hope  Therapeutic Modalities: Humor; Intentional therapeutic touch  Other Caring Modalities: Hourly rounding    Strength:    Manual Muscle Testing (LE)  BLE generally decreased, WFL  _________________________________________________   Range Of Motion:  BLE generally decreased, WFL  Functional Mobility:  Bed Mobility:  Supine to Sit: Stand-by assistance  Scooting: Supervision  Transfers:  Sit to Stand: Contact guard assistance  Stand to Sit: Contact guard assistance  Bed to Chair: Moderate assistance  Balance:   Sitting: Intact; With support  Standing: Impaired  Standing - Static: Fair  Standing - Dynamic : Fair    Therapeutic Exercises:   Seated marches x10  LAQ x10  Ankle pumps x10  Pain:  Pain level pre-treatment: 0/10   Pain level post-treatment: 0/10     Activity Tolerance:   Fair    After treatment:   [x]         Patient left in no apparent distress sitting up in chair  []         Patient left in no apparent distress in bed  [x]         Call bell left within reach  [x]         Nursing notified  []         Caregiver present  []         Bed alarm activated  []         SCDs applied    COMMUNICATION/EDUCATION:   [x]         Role of physical therapy in the acute care setting. [x]         Fall prevention education was provided and the patient/caregiver indicated understanding. [x]         Patient/family have participated as able in goal setting and plan of care.   [x]         Patient/family agree to work toward stated goals and plan of care. []         Patient understands intent and goals of therapy, but is neutral about his/her participation. []         Patient is unable to participate in goal setting/plan of care: ongoing with therapy staff.     Thank you for this referral.  Mike Collazo, SPT   Time Calculation: 19 mins

## 2022-06-21 NOTE — PROGRESS NOTES
Discharge planning    Spoke with Xochitl Muniz, daughter, to discuss discharge plan for patient to return to group home. Gregg Woody Pinto is the owner of [address] [place] [address] and contact information is [phone]. Michelle Torres text message Ms Matthews this am concerning discharge plan. Called Josselyn Matthews to confirm that patient is able to return to group home. Left a voice message and waiting for a return call.     JENNY Mays, RN  Pager # [phone]  Care Manager

## 2022-06-21 NOTE — PROGRESS NOTES
Discharge planning    Received call from Chase Armando, group home owner, with [facility]. Per Ms Matthews, patient can return to address on facesheet. She is rearranging patient's room to add an hospital for bed for his return. Explained that patient will return with home health services. Also, patient pays out of pocket for a personal care aide due to over income for medicaid. Mrs. Matthews will only need patient's discharge paperwork and can be sent with patient to group [facility]. Discharge order noted for today. Freedom of choice obtained for any local home health agency. Met with patient, spoke with Juanita Marinelli, daughter,  and  Chase Armando via phone and  are agreeable to the transition plan today. Transport has been arranged through [address] [place] [address],[address] at 10 pm. Patient's discharge summary and home health  orders have been forwarded to /center [facility] home health  agency via [facility]. Updated bedside RN, Sadie/Uma,  to the transition plan. Discharge information has been documented on the AVS.     Spoke with Chase Armando concerning transportation arranged for 10 pm. Per Ms. Matthews, someone is in group home 24 hours a day and will be waiting for his arrival.    DME orders for  [facility] and RW faxed to [facility] via [facility]. Transportation to [facility] Address [place] [address] [place] [address]  and phone number is [phone]  Patient will require BLS transport.    Pt requires Stretcher If stretcher, reason: Generalized weakness, acute on chronic heart failure, Morbid obesity, Bilateral lower extremity pain, HTN, fluid overload,Peripheral vascular disease, paroxysmal atrial fibrillation, HX Parkinson's disease, Cardiomyopathy  Patient is currently requiring oxygen No   Height: 6' Weight: 341 lb  Pt is on isolation: No    Is the pt ready now? yes  Requested time: Next Available  PCS Faxed: yes  Insurance verified on face sheet: yes  Auth needed for transport: N/A  CM completed PCS/ Envelope and placed on chart. Spoke with Radha Villalba with [facility].     Oswaldo Frost BSN, RN  Pager # [phone]  Care Manager

## 2022-06-21 NOTE — PROGRESS NOTES
Wound Prevention Checklist    Patient: Hugo Mcmahon (51 y.o. male)  Date: 6/20/2022  Diagnosis: Fluid overload [E87.70]  Scrotal edema [N50.89]  Bilateral lower extremity pain [M79.604, M79.605] Acute on chronic diastolic heart failure (HCC)    Precautions: Skin,Fall       [x]  Heel prevention boots placed on patient    [x]  Patient turned q2h during shift    []  Lift team ordered    []  Patient on [place] bed/Specialty bed    []  Each Wound is documented during shift (Stage, Color, drainage, odor, measurements, and dressings)    [x]  Dual skin checks done at bedside during shift report with Bella Santos RN

## 2022-06-21 NOTE — PROGRESS NOTES
Warfarin Dosing - Pharmacy Consult Note  Consulting Provider: EULALIA Micehl  Indication: History of VTE/PE and Atrial Fibrillation  Warfarin Dose prior to admission: 30 mg/week (5 mg po daily except Monday and Friday, he takes 2.5 mg)  Anticoag visits from [facility]   Concurrent anticoagulants/antiplatelets: none  Significant Drug Interactions: No obvious interactions  Recent Labs     06/21/22  0243 06/20/22  1735 06/20/22  0256 06/19/22  0135   INR 2.1*  --  2.1* 2.0*   HGB  --  14.2  --   --    PLT  --  109*  --   --        Date      INR       Dose  6/19        2.0        5 mg  6/18        1.8        5 mg  6/17        1.7        5 mg  6/16        1.8        5 mg  6/15         2.1       5 mg  6/20         2.1       5 mg  6/21         2.1       5 mg      Assessment/Plan  Goal INR: 2 - 3  5 mg dose Warfarin for 6/21  Active problem list reviewed. INR orders are placed. Chart reviewed for pertinent labs, drug/diet interactions, and past doses. Documentation of patient's clinical condition was reviewed. Pharmacy Dosing:  Pharmacy will continue to follow.      Laury Cardenas, YannickD

## 2022-06-22 NOTE — PROGRESS NOTES
Patient discharged to group home . He was picked up by transportation at 2:55 AM. Discharge summary given to transportation together with patients belonging.

## 2022-06-22 NOTE — PROGRESS NOTES
Problem: Falls - Risk of  Goal: *Absence of Falls  Description: Document Steven Ventura Fall Risk and appropriate interventions in the flowsheet. Outcome: Progressing Towards Goal  Note: Fall Risk Interventions:  Mobility Interventions: Assess mobility with egress test         Medication Interventions: Evaluate medications/consider consulting pharmacy    Elimination Interventions: Bed/chair exit alarm,Call light in reach    History of Falls Interventions: Bed/chair exit alarm,Consult care management for discharge planning         Problem: Patient Education: Go to Patient Education Activity  Goal: Patient/Family Education  Outcome: Progressing Towards Goal     Problem: Pain  Goal: *Control of Pain  Outcome: Progressing Towards Goal  Goal: *PALLIATIVE CARE:  Alleviation of Pain  Outcome: Progressing Towards Goal     Problem: Pressure Injury - Risk of  Goal: *Prevention of pressure injury  Description: Document Jean Scale and appropriate interventions in the flowsheet.   Outcome: Progressing Towards Goal  Note: Pressure Injury Interventions:  Sensory Interventions: Assess changes in LOC    Moisture Interventions: Absorbent underpads    Activity Interventions: Assess need for specialty bed,PT/OT evaluation    Mobility Interventions: Pressure redistribution bed/mattress (bed type)    Nutrition Interventions: Document food/fluid/supplement intake    Friction and Shear Interventions: HOB 30 degrees or less                Problem: Patient Education: Go to Patient Education Activity  Goal: Patient/Family Education  Outcome: Progressing Towards Goal     Problem: Patient Education: Go to Patient Education Activity  Goal: Patient/Family Education  Outcome: Progressing Towards Goal

## 2022-06-22 NOTE — ROUTINE PROCESS
Bedside shift change report given to MARV Myers (oncoming nurse) by Lindy Espana RN (offgoing nurse). Report included the following information SBAR, Kardex, Intake/Output and MAR.

## 2022-06-23 PROBLEM — K92.2 LOWER GI BLEED: Status: ACTIVE | Noted: 2022-06-23
